# Patient Record
Sex: FEMALE | Race: WHITE | HISPANIC OR LATINO
[De-identification: names, ages, dates, MRNs, and addresses within clinical notes are randomized per-mention and may not be internally consistent; named-entity substitution may affect disease eponyms.]

---

## 2017-01-05 ENCOUNTER — APPOINTMENT (OUTPATIENT)
Dept: PULMONOLOGY | Facility: CLINIC | Age: 32
End: 2017-01-05

## 2017-01-05 VITALS
SYSTOLIC BLOOD PRESSURE: 120 MMHG | TEMPERATURE: 98.8 F | BODY MASS INDEX: 51.03 KG/M2 | HEART RATE: 89 BPM | RESPIRATION RATE: 16 BRPM | HEIGHT: 63 IN | DIASTOLIC BLOOD PRESSURE: 66 MMHG | WEIGHT: 288 LBS

## 2017-01-12 ENCOUNTER — APPOINTMENT (OUTPATIENT)
Dept: PAIN MANAGEMENT | Facility: CLINIC | Age: 32
End: 2017-01-12

## 2017-01-12 VITALS
WEIGHT: 288 LBS | DIASTOLIC BLOOD PRESSURE: 74 MMHG | HEART RATE: 116 BPM | HEIGHT: 63 IN | BODY MASS INDEX: 51.03 KG/M2 | SYSTOLIC BLOOD PRESSURE: 115 MMHG

## 2017-01-26 ENCOUNTER — OTHER (OUTPATIENT)
Age: 32
End: 2017-01-26

## 2017-01-27 ENCOUNTER — APPOINTMENT (OUTPATIENT)
Dept: ENDOCRINOLOGY | Facility: CLINIC | Age: 32
End: 2017-01-27

## 2017-01-27 VITALS
BODY MASS INDEX: 51.03 KG/M2 | DIASTOLIC BLOOD PRESSURE: 78 MMHG | HEIGHT: 63 IN | WEIGHT: 288 LBS | OXYGEN SATURATION: 96 % | SYSTOLIC BLOOD PRESSURE: 112 MMHG | HEART RATE: 111 BPM

## 2017-01-31 ENCOUNTER — OTHER (OUTPATIENT)
Age: 32
End: 2017-01-31

## 2017-02-01 ENCOUNTER — APPOINTMENT (OUTPATIENT)
Dept: INTERNAL MEDICINE | Facility: CLINIC | Age: 32
End: 2017-02-01

## 2017-02-01 ENCOUNTER — LABORATORY RESULT (OUTPATIENT)
Age: 32
End: 2017-02-01

## 2017-02-01 ENCOUNTER — OUTPATIENT (OUTPATIENT)
Dept: OUTPATIENT SERVICES | Facility: HOSPITAL | Age: 32
LOS: 1 days | End: 2017-02-01
Payer: MEDICAID

## 2017-02-01 VITALS
DIASTOLIC BLOOD PRESSURE: 70 MMHG | HEIGHT: 63 IN | WEIGHT: 292 LBS | SYSTOLIC BLOOD PRESSURE: 114 MMHG | BODY MASS INDEX: 51.74 KG/M2

## 2017-02-01 DIAGNOSIS — T82.599A OTHER MECHANICAL COMPLICATION OF UNSPECIFIED CARDIAC AND VASCULAR DEVICES AND IMPLANTS, INITIAL ENCOUNTER: Chronic | ICD-10-CM

## 2017-02-01 DIAGNOSIS — I10 ESSENTIAL (PRIMARY) HYPERTENSION: ICD-10-CM

## 2017-02-01 DIAGNOSIS — Z98.2 PRESENCE OF CEREBROSPINAL FLUID DRAINAGE DEVICE: Chronic | ICD-10-CM

## 2017-02-01 DIAGNOSIS — Z98.890 OTHER SPECIFIED POSTPROCEDURAL STATES: Chronic | ICD-10-CM

## 2017-02-01 LAB
HBA1C BLD-MCNC: 6.4 % — HIGH (ref 4–5.6)
URATE SERPL-MCNC: 5.6 MG/DL — SIGNIFICANT CHANGE UP (ref 2.5–7)

## 2017-02-01 PROCEDURE — G0463: CPT

## 2017-02-01 PROCEDURE — 84550 ASSAY OF BLOOD/URIC ACID: CPT

## 2017-02-01 PROCEDURE — 83036 HEMOGLOBIN GLYCOSYLATED A1C: CPT

## 2017-02-02 DIAGNOSIS — E66.01 MORBID (SEVERE) OBESITY DUE TO EXCESS CALORIES: ICD-10-CM

## 2017-02-02 DIAGNOSIS — R00.0 TACHYCARDIA, UNSPECIFIED: ICD-10-CM

## 2017-02-02 DIAGNOSIS — G43.909 MIGRAINE, UNSPECIFIED, NOT INTRACTABLE, WITHOUT STATUS MIGRAINOSUS: ICD-10-CM

## 2017-02-02 DIAGNOSIS — M10.9 GOUT, UNSPECIFIED: ICD-10-CM

## 2017-02-02 DIAGNOSIS — E28.2 POLYCYSTIC OVARIAN SYNDROME: ICD-10-CM

## 2017-02-02 DIAGNOSIS — R73.09 OTHER ABNORMAL GLUCOSE: ICD-10-CM

## 2017-02-02 DIAGNOSIS — F31.9 BIPOLAR DISORDER, UNSPECIFIED: ICD-10-CM

## 2017-02-03 DIAGNOSIS — R47.9 UNSPECIFIED SPEECH DISTURBANCES: ICD-10-CM

## 2017-02-06 ENCOUNTER — APPOINTMENT (OUTPATIENT)
Dept: PAIN MANAGEMENT | Facility: CLINIC | Age: 32
End: 2017-02-06

## 2017-02-06 VITALS — BODY MASS INDEX: 51.91 KG/M2 | WEIGHT: 293 LBS | HEIGHT: 63 IN

## 2017-02-06 VITALS — HEART RATE: 109 BPM | DIASTOLIC BLOOD PRESSURE: 70 MMHG | SYSTOLIC BLOOD PRESSURE: 110 MMHG

## 2017-02-13 ENCOUNTER — EMERGENCY (EMERGENCY)
Facility: HOSPITAL | Age: 32
LOS: 1 days | Discharge: HOME | End: 2017-02-13
Attending: EMERGENCY MEDICINE | Admitting: EMERGENCY MEDICINE
Payer: MEDICAID

## 2017-02-13 VITALS
RESPIRATION RATE: 16 BRPM | HEIGHT: 63 IN | SYSTOLIC BLOOD PRESSURE: 118 MMHG | WEIGHT: 289.91 LBS | HEART RATE: 112 BPM | TEMPERATURE: 99 F | OXYGEN SATURATION: 95 % | DIASTOLIC BLOOD PRESSURE: 84 MMHG

## 2017-02-13 VITALS
OXYGEN SATURATION: 98 % | DIASTOLIC BLOOD PRESSURE: 71 MMHG | RESPIRATION RATE: 18 BRPM | SYSTOLIC BLOOD PRESSURE: 120 MMHG | HEART RATE: 100 BPM

## 2017-02-13 DIAGNOSIS — T82.599A OTHER MECHANICAL COMPLICATION OF UNSPECIFIED CARDIAC AND VASCULAR DEVICES AND IMPLANTS, INITIAL ENCOUNTER: Chronic | ICD-10-CM

## 2017-02-13 DIAGNOSIS — Z98.890 OTHER SPECIFIED POSTPROCEDURAL STATES: Chronic | ICD-10-CM

## 2017-02-13 DIAGNOSIS — Z98.2 PRESENCE OF CEREBROSPINAL FLUID DRAINAGE DEVICE: Chronic | ICD-10-CM

## 2017-02-13 DIAGNOSIS — R51 HEADACHE: ICD-10-CM

## 2017-02-13 LAB
ALBUMIN SERPL ELPH-MCNC: 4.5 G/DL — SIGNIFICANT CHANGE UP (ref 3.3–5)
ALP SERPL-CCNC: 112 U/L — SIGNIFICANT CHANGE UP (ref 40–120)
ALT FLD-CCNC: 111 U/L RC — HIGH (ref 10–45)
ANION GAP SERPL CALC-SCNC: 12 MMOL/L — SIGNIFICANT CHANGE UP (ref 5–17)
APPEARANCE UR: ABNORMAL
AST SERPL-CCNC: 58 U/L — HIGH (ref 10–40)
BASOPHILS # BLD AUTO: 0 K/UL — SIGNIFICANT CHANGE UP (ref 0–0.2)
BASOPHILS NFR BLD AUTO: 0.4 % — SIGNIFICANT CHANGE UP (ref 0–2)
BILIRUB SERPL-MCNC: 0.2 MG/DL — SIGNIFICANT CHANGE UP (ref 0.2–1.2)
BILIRUB UR-MCNC: NEGATIVE — SIGNIFICANT CHANGE UP
BUN SERPL-MCNC: 10 MG/DL — SIGNIFICANT CHANGE UP (ref 7–23)
CALCIUM SERPL-MCNC: 10.2 MG/DL — SIGNIFICANT CHANGE UP (ref 8.4–10.5)
CHLORIDE SERPL-SCNC: 101 MMOL/L — SIGNIFICANT CHANGE UP (ref 96–108)
CO2 SERPL-SCNC: 28 MMOL/L — SIGNIFICANT CHANGE UP (ref 22–31)
COLOR SPEC: YELLOW — SIGNIFICANT CHANGE UP
CREAT SERPL-MCNC: 0.76 MG/DL — SIGNIFICANT CHANGE UP (ref 0.5–1.3)
DIFF PNL FLD: NEGATIVE — SIGNIFICANT CHANGE UP
EOSINOPHIL # BLD AUTO: 0.1 K/UL — SIGNIFICANT CHANGE UP (ref 0–0.5)
EOSINOPHIL NFR BLD AUTO: 1.5 % — SIGNIFICANT CHANGE UP (ref 0–6)
EPI CELLS # UR: SIGNIFICANT CHANGE UP /HPF
GLUCOSE SERPL-MCNC: 111 MG/DL — HIGH (ref 70–99)
GLUCOSE UR QL: NEGATIVE — SIGNIFICANT CHANGE UP
HCG SERPL-ACNC: <2 MIU/ML — SIGNIFICANT CHANGE UP
HCT VFR BLD CALC: 47.2 % — HIGH (ref 34.5–45)
HGB BLD-MCNC: 15.5 G/DL — SIGNIFICANT CHANGE UP (ref 11.5–15.5)
KETONES UR-MCNC: NEGATIVE — SIGNIFICANT CHANGE UP
LEUKOCYTE ESTERASE UR-ACNC: ABNORMAL
LYMPHOCYTES # BLD AUTO: 2 K/UL — SIGNIFICANT CHANGE UP (ref 1–3.3)
LYMPHOCYTES # BLD AUTO: 22 % — SIGNIFICANT CHANGE UP (ref 13–44)
MCHC RBC-ENTMCNC: 29.4 PG — SIGNIFICANT CHANGE UP (ref 27–34)
MCHC RBC-ENTMCNC: 32.9 GM/DL — SIGNIFICANT CHANGE UP (ref 32–36)
MCV RBC AUTO: 89.3 FL — SIGNIFICANT CHANGE UP (ref 80–100)
MONOCYTES # BLD AUTO: 0.6 K/UL — SIGNIFICANT CHANGE UP (ref 0–0.9)
MONOCYTES NFR BLD AUTO: 6.8 % — SIGNIFICANT CHANGE UP (ref 2–14)
NEUTROPHILS # BLD AUTO: 6.5 K/UL — SIGNIFICANT CHANGE UP (ref 1.8–7.4)
NEUTROPHILS NFR BLD AUTO: 69.3 % — SIGNIFICANT CHANGE UP (ref 43–77)
NITRITE UR-MCNC: NEGATIVE — SIGNIFICANT CHANGE UP
PH UR: 7 — SIGNIFICANT CHANGE UP (ref 4.8–8)
PLATELET # BLD AUTO: 459 K/UL — HIGH (ref 150–400)
POTASSIUM SERPL-MCNC: 4 MMOL/L — SIGNIFICANT CHANGE UP (ref 3.5–5.3)
POTASSIUM SERPL-SCNC: 4 MMOL/L — SIGNIFICANT CHANGE UP (ref 3.5–5.3)
PROT SERPL-MCNC: 8.6 G/DL — HIGH (ref 6–8.3)
PROT UR-MCNC: 30 MG/DL
RBC # BLD: 5.28 M/UL — HIGH (ref 3.8–5.2)
RBC # FLD: 13.7 % — SIGNIFICANT CHANGE UP (ref 10.3–14.5)
SODIUM SERPL-SCNC: 141 MMOL/L — SIGNIFICANT CHANGE UP (ref 135–145)
SP GR SPEC: 1.03 — HIGH (ref 1.01–1.02)
UROBILINOGEN FLD QL: NEGATIVE — SIGNIFICANT CHANGE UP
WBC # BLD: 9.3 K/UL — SIGNIFICANT CHANGE UP (ref 3.8–10.5)
WBC # FLD AUTO: 9.3 K/UL — SIGNIFICANT CHANGE UP (ref 3.8–10.5)
WBC UR QL: SIGNIFICANT CHANGE UP /HPF (ref 0–5)

## 2017-02-13 PROCEDURE — 74177 CT ABD & PELVIS W/CONTRAST: CPT

## 2017-02-13 PROCEDURE — 71010: CPT | Mod: 26

## 2017-02-13 PROCEDURE — 70450 CT HEAD/BRAIN W/O DYE: CPT

## 2017-02-13 PROCEDURE — 84702 CHORIONIC GONADOTROPIN TEST: CPT

## 2017-02-13 PROCEDURE — 70450 CT HEAD/BRAIN W/O DYE: CPT | Mod: 26

## 2017-02-13 PROCEDURE — 71010: CPT

## 2017-02-13 PROCEDURE — 99284 EMERGENCY DEPT VISIT MOD MDM: CPT | Mod: 25

## 2017-02-13 PROCEDURE — 80053 COMPREHEN METABOLIC PANEL: CPT

## 2017-02-13 PROCEDURE — 74000: CPT | Mod: 26

## 2017-02-13 PROCEDURE — 70250 X-RAY EXAM OF SKULL: CPT | Mod: 26

## 2017-02-13 PROCEDURE — 70250 X-RAY EXAM OF SKULL: CPT

## 2017-02-13 PROCEDURE — 74000: CPT

## 2017-02-13 PROCEDURE — 96374 THER/PROPH/DIAG INJ IV PUSH: CPT | Mod: XU

## 2017-02-13 PROCEDURE — 81001 URINALYSIS AUTO W/SCOPE: CPT

## 2017-02-13 PROCEDURE — 74177 CT ABD & PELVIS W/CONTRAST: CPT | Mod: 26

## 2017-02-13 PROCEDURE — 99285 EMERGENCY DEPT VISIT HI MDM: CPT

## 2017-02-13 PROCEDURE — 85027 COMPLETE CBC AUTOMATED: CPT

## 2017-02-13 RX ORDER — CEPHALEXIN 500 MG
1 CAPSULE ORAL
Qty: 21 | Refills: 0 | OUTPATIENT
Start: 2017-02-13 | End: 2017-02-20

## 2017-02-13 RX ORDER — METOCLOPRAMIDE HCL 10 MG
10 TABLET ORAL ONCE
Qty: 0 | Refills: 0 | Status: COMPLETED | OUTPATIENT
Start: 2017-02-13 | End: 2017-02-13

## 2017-02-13 RX ORDER — METOCLOPRAMIDE HCL 10 MG
10 TABLET ORAL ONCE
Qty: 0 | Refills: 0 | Status: DISCONTINUED | OUTPATIENT
Start: 2017-02-13 | End: 2017-02-13

## 2017-02-13 RX ORDER — CEPHALEXIN 500 MG
500 CAPSULE ORAL ONCE
Qty: 0 | Refills: 0 | Status: COMPLETED | OUTPATIENT
Start: 2017-02-13 | End: 2017-02-13

## 2017-02-13 RX ORDER — SODIUM CHLORIDE 9 MG/ML
1000 INJECTION INTRAMUSCULAR; INTRAVENOUS; SUBCUTANEOUS ONCE
Qty: 0 | Refills: 0 | Status: COMPLETED | OUTPATIENT
Start: 2017-02-13 | End: 2017-02-13

## 2017-02-13 RX ADMIN — Medication 10 MILLIGRAM(S): at 12:28

## 2017-02-13 RX ADMIN — Medication 500 MILLIGRAM(S): at 18:44

## 2017-02-13 RX ADMIN — SODIUM CHLORIDE 2000 MILLILITER(S): 9 INJECTION INTRAMUSCULAR; INTRAVENOUS; SUBCUTANEOUS at 12:28

## 2017-02-13 NOTE — ED PROVIDER NOTE - NS ED MD SCRIBE ATTENDING SCRIBE SECTIONS
CONSULTATIONS/SHIFT CHANGE/PHYSICAL EXAM/DISPOSITION/HIV/VITAL SIGNS( Pullset)/PAST MEDICAL/SURGICAL/SOCIAL HISTORY/PROGRESS NOTE/REVIEW OF SYSTEMS/RESULTS/HISTORY OF PRESENT ILLNESS

## 2017-02-13 NOTE — ED PROVIDER NOTE - EYES, MLM
Clear bilaterally, pupils equal, round and reactive to light. Clear bilaterally, pupils equal, round and reactive to light. EOMI.

## 2017-02-13 NOTE — ED ADULT NURSE NOTE - PMH
Bipolar illness    Gout    Hydrocephalus  shunt  Lymphadenopathy  mediastinal  Meningitis    Migraine    Migraines    Obesity    PCOS (polycystic ovarian syndrome)    Tachycardia  sinus

## 2017-02-13 NOTE — ED PROVIDER NOTE - DETAILS:
****ATTENDING**** 32yo f hx listed pw HA similar to her prior migraine and Abd pain with discharge. States HA is similar to prior migraine. No recent infection or fever. Abd with discharge at home, mildly tender on exam. Check CT, Labs, Pain control Re eval. Ro Hydrocephalus and shunt malfunction, also ro abd abscess.

## 2017-02-13 NOTE — ED PROVIDER NOTE - PMH
Bipolar illness    Diabetes    Gout    Hydrocephalus  shunt  Lymphadenopathy  mediastinal  Meningitis    Migraine    Migraines    Obesity    PCOS (polycystic ovarian syndrome)    Tachycardia  sinus

## 2017-02-13 NOTE — ED ADULT NURSE NOTE - ED STAT RN HANDOFF DETAILS
Report received from WILLIAM Tom  Pt stable, safety maintained at time of dc with improved symptoms.

## 2017-02-13 NOTE — ED PROVIDER NOTE - GASTROINTESTINAL, MLM
RLW 1x1cm lesion, mildly tender, diffusely arounded, RLQ 1x1cm lesion, mildly tender diffusely around it, no overlying erythema or cellulitis

## 2017-02-13 NOTE — ED PROVIDER NOTE - PROGRESS NOTE DETAILS
Patient is reassessed, states feeling much better at this time, HA has resolved. Will follow up imaging and re eval. RG Patient states she feels that her shunt is out of place. RG ------------ATTENDING NOTE------------   signed out to me, pending shunt series, prelim wnl, pt/family very upset over wait, demanding d/c, understand ddx, tx, tay, close f/u, VS wnl (HR 80's. RR 17)   - Kashmir Silver MD   -------------------------------------------------

## 2017-02-13 NOTE — ED ADULT NURSE NOTE - PSH
Brain ventricular shunt obstruction  2015  H/O brain surgery  14x surgery. Dedeshadi revisions of  shunt  S/P  shunt  2015 latest revision

## 2017-02-13 NOTE — ED PROVIDER NOTE - OBJECTIVE STATEMENT
30 y/o female with pmhx of bipolar illness, gout, diabetes, hydrocephalus, lymphadenopathy, meningitis, migraines, obesity, and PCOS presents with c/o migraine for a few weeks. Pt states she also has had an abdominal abscess since Wednesday (2/8/17). Per mother it was draining pus and blood on Saturday. Per mother, in 2014 she was diagnosed with hydrocephalus, and has a  shunt. Pt states she takes Abilify, and Zofran. Denies changes in vision, vomiting, cough, runny nose, fever, chills, or any other complaints. 32 y/o female with pmhx of bipolar illness, gout, diabetes, hydrocephalus secondary to TBI, lymphadenopathy, meningitis, migraines, obesity, and PCOS presents with c/o migraine for a few weeks. Pt states she also has had an abdominal abscess since Wednesday (2/8/17). Per mother it was draining pus and blood on Saturday. States she has history of abdominal abscess that required wound vac and drainage. Per mother, in 2014 she was diagnosed with hydrocephalus, and has a  shunt. Pt states she takes Abilify, and Zofran. States HA is diffuse similar to her previous Migraine but oral medications were not helping. Denies changes in vision, vomiting, cough, runny nose, fever, chills, or any other complaints.

## 2017-02-13 NOTE — ED PROVIDER NOTE - CARE PLAN
Instructions for follow-up, activity and diet:	1. Follow up with PMD or [facility] at [phone] in 24-48hrs.  2. Follow up with your Neurologist in 24-48 hrs.  3. Continue hydration with fluids.  2. If patient develops worsening symptoms of Headache, nausea, vomiting, change in vision, discharge or pain in abdomen, fever or any other concern return to the ER. Principal Discharge DX:	Headache  Instructions for follow-up, activity and diet:	1. Follow up with PMD or [facility] at [phone] in 24-48hrs.  2. Follow up with your Neurologist in 24-48 hrs.  3. Continue hydration with fluids.  2. If patient develops worsening symptoms of Headache, nausea, vomiting, change in vision, discharge or pain in abdomen, fever or any other concern return to the ER. Principal Discharge DX:	Headache  Instructions for follow-up, activity and diet:	1. Follow up with PMD or [facility] at [phone] in 24-48hrs.  2. Follow up with your Neurologist in 24-48 hrs.  3. Continue hydration with fluids.  2. If patient develops worsening symptoms of Headache, nausea, vomiting, change in vision, discharge or pain in abdomen, fever or any other concern return to the ER. Principal Discharge DX:	Headache  Instructions for follow-up, activity and diet:	1. Follow up with PMD or [facility] at [phone] in 24-48hrs.  2. Follow up with your Neurologist in 24-48 hrs.  3. Continue hydration with fluids.  2. If patient develops worsening symptoms of Headache, nausea, vomiting, change in vision, discharge or pain in abdomen, fever or any other concern return to the ER.

## 2017-02-13 NOTE — ED PROVIDER NOTE - PLAN OF CARE
1. Follow up with PMD or [facility] at [phone] in 24-48hrs.  2. Follow up with your Neurologist in 24-48 hrs.  3. Continue hydration with fluids.  2. If patient develops worsening symptoms of Headache, nausea, vomiting, change in vision, discharge or pain in abdomen, fever or any other concern return to the ER.

## 2017-02-13 NOTE — ED PROVIDER NOTE - MEDICAL DECISION MAKING DETAILS
****ATTENDING**** 30yo f hx listed pw HA similar to her prior migraine and Abd pain with discharge. States HA is similar to prior migraine. No recent infection or fever. Abd with discharge at home, mildly tender on exam. Check CT, Labs, Pain control Re eval. Ro Hydrocephalus and shunt malfunction, also ro abd abscess.

## 2017-02-16 ENCOUNTER — MESSAGE (OUTPATIENT)
Age: 32
End: 2017-02-16

## 2017-02-16 DIAGNOSIS — Z86.39 PERSONAL HISTORY OF OTHER ENDOCRINE, NUTRITIONAL AND METABOLIC DISEASE: ICD-10-CM

## 2017-02-16 DIAGNOSIS — E78.5 HYPERLIPIDEMIA, UNSPECIFIED: ICD-10-CM

## 2017-02-17 ENCOUNTER — APPOINTMENT (OUTPATIENT)
Dept: INTERNAL MEDICINE | Facility: CLINIC | Age: 32
End: 2017-02-17

## 2017-02-23 ENCOUNTER — APPOINTMENT (OUTPATIENT)
Dept: NEUROSURGERY | Facility: CLINIC | Age: 32
End: 2017-02-23

## 2017-02-23 ENCOUNTER — OUTPATIENT (OUTPATIENT)
Dept: OUTPATIENT SERVICES | Facility: HOSPITAL | Age: 32
LOS: 1 days | End: 2017-02-23
Payer: MEDICAID

## 2017-02-23 ENCOUNTER — APPOINTMENT (OUTPATIENT)
Dept: INTERNAL MEDICINE | Facility: CLINIC | Age: 32
End: 2017-02-23

## 2017-02-23 VITALS
HEIGHT: 63 IN | WEIGHT: 290 LBS | BODY MASS INDEX: 51.38 KG/M2 | SYSTOLIC BLOOD PRESSURE: 104 MMHG | DIASTOLIC BLOOD PRESSURE: 72 MMHG

## 2017-02-23 VITALS
TEMPERATURE: 98.1 F | HEART RATE: 114 BPM | SYSTOLIC BLOOD PRESSURE: 116 MMHG | WEIGHT: 292 LBS | RESPIRATION RATE: 16 BRPM | BODY MASS INDEX: 51.74 KG/M2 | HEIGHT: 63 IN | DIASTOLIC BLOOD PRESSURE: 79 MMHG | OXYGEN SATURATION: 97 %

## 2017-02-23 DIAGNOSIS — Z98.2 PRESENCE OF CEREBROSPINAL FLUID DRAINAGE DEVICE: Chronic | ICD-10-CM

## 2017-02-23 DIAGNOSIS — T82.599A OTHER MECHANICAL COMPLICATION OF UNSPECIFIED CARDIAC AND VASCULAR DEVICES AND IMPLANTS, INITIAL ENCOUNTER: Chronic | ICD-10-CM

## 2017-02-23 DIAGNOSIS — I10 ESSENTIAL (PRIMARY) HYPERTENSION: ICD-10-CM

## 2017-02-23 DIAGNOSIS — Z98.890 OTHER SPECIFIED POSTPROCEDURAL STATES: Chronic | ICD-10-CM

## 2017-02-23 PROBLEM — E78.5 HYPERLIPIDEMIA: Status: RESOLVED | Noted: 2017-02-23 | Resolved: 2017-02-23

## 2017-02-23 PROCEDURE — G0463: CPT

## 2017-02-24 DIAGNOSIS — E66.01 MORBID (SEVERE) OBESITY DUE TO EXCESS CALORIES: ICD-10-CM

## 2017-02-24 DIAGNOSIS — R42 DIZZINESS AND GIDDINESS: ICD-10-CM

## 2017-03-09 ENCOUNTER — APPOINTMENT (OUTPATIENT)
Dept: SLEEP CENTER | Facility: CLINIC | Age: 32
End: 2017-03-09

## 2017-03-09 ENCOUNTER — OUTPATIENT (OUTPATIENT)
Dept: OUTPATIENT SERVICES | Facility: HOSPITAL | Age: 32
LOS: 1 days | End: 2017-03-09
Payer: MEDICAID

## 2017-03-09 DIAGNOSIS — Z98.890 OTHER SPECIFIED POSTPROCEDURAL STATES: Chronic | ICD-10-CM

## 2017-03-09 DIAGNOSIS — T82.599A OTHER MECHANICAL COMPLICATION OF UNSPECIFIED CARDIAC AND VASCULAR DEVICES AND IMPLANTS, INITIAL ENCOUNTER: Chronic | ICD-10-CM

## 2017-03-09 DIAGNOSIS — Z98.2 PRESENCE OF CEREBROSPINAL FLUID DRAINAGE DEVICE: Chronic | ICD-10-CM

## 2017-03-09 PROCEDURE — 95807 SLEEP STUDY ATTENDED: CPT

## 2017-03-10 DIAGNOSIS — G47.33 OBSTRUCTIVE SLEEP APNEA (ADULT) (PEDIATRIC): ICD-10-CM

## 2017-04-05 ENCOUNTER — EMERGENCY (EMERGENCY)
Facility: HOSPITAL | Age: 32
LOS: 1 days | Discharge: HOME | End: 2017-04-05
Attending: EMERGENCY MEDICINE | Admitting: EMERGENCY MEDICINE
Payer: MEDICAID

## 2017-04-05 VITALS
HEART RATE: 112 BPM | DIASTOLIC BLOOD PRESSURE: 81 MMHG | OXYGEN SATURATION: 86 % | RESPIRATION RATE: 16 BRPM | TEMPERATURE: 98 F | SYSTOLIC BLOOD PRESSURE: 107 MMHG

## 2017-04-05 DIAGNOSIS — T82.599A OTHER MECHANICAL COMPLICATION OF UNSPECIFIED CARDIAC AND VASCULAR DEVICES AND IMPLANTS, INITIAL ENCOUNTER: Chronic | ICD-10-CM

## 2017-04-05 DIAGNOSIS — Z98.890 OTHER SPECIFIED POSTPROCEDURAL STATES: Chronic | ICD-10-CM

## 2017-04-05 DIAGNOSIS — H53.8 OTHER VISUAL DISTURBANCES: ICD-10-CM

## 2017-04-05 DIAGNOSIS — Z88.8 ALLERGY STATUS TO OTHER DRUGS, MEDICAMENTS AND BIOLOGICAL SUBSTANCES STATUS: ICD-10-CM

## 2017-04-05 DIAGNOSIS — Z98.890 OTHER SPECIFIED POSTPROCEDURAL STATES: ICD-10-CM

## 2017-04-05 DIAGNOSIS — Z79.899 OTHER LONG TERM (CURRENT) DRUG THERAPY: ICD-10-CM

## 2017-04-05 DIAGNOSIS — Z98.2 PRESENCE OF CEREBROSPINAL FLUID DRAINAGE DEVICE: Chronic | ICD-10-CM

## 2017-04-05 DIAGNOSIS — E11.9 TYPE 2 DIABETES MELLITUS WITHOUT COMPLICATIONS: ICD-10-CM

## 2017-04-05 PROCEDURE — 93010 ELECTROCARDIOGRAM REPORT: CPT

## 2017-04-05 PROCEDURE — 99285 EMERGENCY DEPT VISIT HI MDM: CPT | Mod: 25

## 2017-04-05 NOTE — ED ADULT TRIAGE NOTE - CHIEF COMPLAINT QUOTE
change in vision: left eye cannot see and right eye is blurry and   pain in left side of head   2 shunts in head

## 2017-04-05 NOTE — ED ADULT NURSE NOTE - OBJECTIVE STATEMENT
31 year old female patient with pmh of hydrocephalus and 2  shuts presents to ED with sudden onset of blindness to L eye and blurriness to R eye and headache. Patient states the visual changes started immediately after laying down. Patient reports chronic migraine headaches, and states the pain is currently tolerable. Denies previous similar episodes of visual changes, loss of balance or coordination, fever, chills. Able to ambulate with steady gait. Pt reporting some numbness and tingling to R arm and hand. Family at bedside

## 2017-04-06 VITALS
OXYGEN SATURATION: 98 % | DIASTOLIC BLOOD PRESSURE: 80 MMHG | SYSTOLIC BLOOD PRESSURE: 117 MMHG | RESPIRATION RATE: 16 BRPM | HEART RATE: 110 BPM | TEMPERATURE: 98 F

## 2017-04-06 LAB
ALBUMIN SERPL ELPH-MCNC: 4.3 G/DL — SIGNIFICANT CHANGE UP (ref 3.3–5)
ALP SERPL-CCNC: 113 U/L — SIGNIFICANT CHANGE UP (ref 40–120)
ALT FLD-CCNC: 111 U/L RC — HIGH (ref 10–45)
ANION GAP SERPL CALC-SCNC: 18 MMOL/L — HIGH (ref 5–17)
APTT BLD: 32 SEC — SIGNIFICANT CHANGE UP (ref 27.5–37.4)
AST SERPL-CCNC: 67 U/L — HIGH (ref 10–40)
BASOPHILS # BLD AUTO: 0 K/UL — SIGNIFICANT CHANGE UP (ref 0–0.2)
BASOPHILS NFR BLD AUTO: 0.3 % — SIGNIFICANT CHANGE UP (ref 0–2)
BILIRUB SERPL-MCNC: 0.2 MG/DL — SIGNIFICANT CHANGE UP (ref 0.2–1.2)
BUN SERPL-MCNC: 17 MG/DL — SIGNIFICANT CHANGE UP (ref 7–23)
CALCIUM SERPL-MCNC: 10.1 MG/DL — SIGNIFICANT CHANGE UP (ref 8.4–10.5)
CHLORIDE SERPL-SCNC: 100 MMOL/L — SIGNIFICANT CHANGE UP (ref 96–108)
CO2 SERPL-SCNC: 24 MMOL/L — SIGNIFICANT CHANGE UP (ref 22–31)
CREAT SERPL-MCNC: 0.92 MG/DL — SIGNIFICANT CHANGE UP (ref 0.5–1.3)
EOSINOPHIL # BLD AUTO: 0.2 K/UL — SIGNIFICANT CHANGE UP (ref 0–0.5)
EOSINOPHIL NFR BLD AUTO: 2.1 % — SIGNIFICANT CHANGE UP (ref 0–6)
ERYTHROCYTE [SEDIMENTATION RATE] IN BLOOD: 55 MM/HR — HIGH (ref 0–15)
GLUCOSE SERPL-MCNC: 111 MG/DL — HIGH (ref 70–99)
HCT VFR BLD CALC: 44.8 % — SIGNIFICANT CHANGE UP (ref 34.5–45)
HGB BLD-MCNC: 15.3 G/DL — SIGNIFICANT CHANGE UP (ref 11.5–15.5)
INR BLD: 1 RATIO — SIGNIFICANT CHANGE UP (ref 0.88–1.16)
LYMPHOCYTES # BLD AUTO: 2.2 K/UL — SIGNIFICANT CHANGE UP (ref 1–3.3)
LYMPHOCYTES # BLD AUTO: 20.5 % — SIGNIFICANT CHANGE UP (ref 13–44)
MCHC RBC-ENTMCNC: 30.3 PG — SIGNIFICANT CHANGE UP (ref 27–34)
MCHC RBC-ENTMCNC: 34.1 GM/DL — SIGNIFICANT CHANGE UP (ref 32–36)
MCV RBC AUTO: 88.7 FL — SIGNIFICANT CHANGE UP (ref 80–100)
MONOCYTES # BLD AUTO: 0.7 K/UL — SIGNIFICANT CHANGE UP (ref 0–0.9)
MONOCYTES NFR BLD AUTO: 7 % — SIGNIFICANT CHANGE UP (ref 2–14)
NEUTROPHILS # BLD AUTO: 7.5 K/UL — HIGH (ref 1.8–7.4)
NEUTROPHILS NFR BLD AUTO: 70.1 % — SIGNIFICANT CHANGE UP (ref 43–77)
PLATELET # BLD AUTO: 428 K/UL — HIGH (ref 150–400)
POTASSIUM SERPL-MCNC: 4.1 MMOL/L — SIGNIFICANT CHANGE UP (ref 3.5–5.3)
POTASSIUM SERPL-SCNC: 4.1 MMOL/L — SIGNIFICANT CHANGE UP (ref 3.5–5.3)
PROT SERPL-MCNC: 8.4 G/DL — HIGH (ref 6–8.3)
PROTHROM AB SERPL-ACNC: 10.8 SEC — SIGNIFICANT CHANGE UP (ref 9.8–12.7)
RBC # BLD: 5.05 M/UL — SIGNIFICANT CHANGE UP (ref 3.8–5.2)
RBC # FLD: 14 % — SIGNIFICANT CHANGE UP (ref 10.3–14.5)
SODIUM SERPL-SCNC: 142 MMOL/L — SIGNIFICANT CHANGE UP (ref 135–145)
WBC # BLD: 10.6 K/UL — HIGH (ref 3.8–10.5)
WBC # FLD AUTO: 10.6 K/UL — HIGH (ref 3.8–10.5)

## 2017-04-06 PROCEDURE — 80053 COMPREHEN METABOLIC PANEL: CPT

## 2017-04-06 PROCEDURE — 85610 PROTHROMBIN TIME: CPT

## 2017-04-06 PROCEDURE — 74000: CPT | Mod: 26

## 2017-04-06 PROCEDURE — 70450 CT HEAD/BRAIN W/O DYE: CPT | Mod: 26,59

## 2017-04-06 PROCEDURE — 93005 ELECTROCARDIOGRAM TRACING: CPT

## 2017-04-06 PROCEDURE — 85027 COMPLETE CBC AUTOMATED: CPT

## 2017-04-06 PROCEDURE — 71010: CPT | Mod: 26

## 2017-04-06 PROCEDURE — 70250 X-RAY EXAM OF SKULL: CPT

## 2017-04-06 PROCEDURE — 70450 CT HEAD/BRAIN W/O DYE: CPT

## 2017-04-06 PROCEDURE — 85652 RBC SED RATE AUTOMATED: CPT

## 2017-04-06 PROCEDURE — 96374 THER/PROPH/DIAG INJ IV PUSH: CPT | Mod: XU

## 2017-04-06 PROCEDURE — 70250 X-RAY EXAM OF SKULL: CPT | Mod: 26

## 2017-04-06 PROCEDURE — 71010: CPT

## 2017-04-06 PROCEDURE — 99284 EMERGENCY DEPT VISIT MOD MDM: CPT | Mod: 25

## 2017-04-06 PROCEDURE — 70496 CT ANGIOGRAPHY HEAD: CPT | Mod: 26

## 2017-04-06 PROCEDURE — 70496 CT ANGIOGRAPHY HEAD: CPT

## 2017-04-06 PROCEDURE — 74000: CPT

## 2017-04-06 PROCEDURE — 85730 THROMBOPLASTIN TIME PARTIAL: CPT

## 2017-04-06 RX ORDER — ACETAMINOPHEN 500 MG
650 TABLET ORAL ONCE
Qty: 0 | Refills: 0 | Status: DISCONTINUED | OUTPATIENT
Start: 2017-04-06 | End: 2017-04-06

## 2017-04-06 RX ORDER — ACETAMINOPHEN 500 MG
1000 TABLET ORAL ONCE
Qty: 0 | Refills: 0 | Status: COMPLETED | OUTPATIENT
Start: 2017-04-06 | End: 2017-04-06

## 2017-04-06 RX ADMIN — Medication 1000 MILLIGRAM(S): at 04:00

## 2017-04-06 RX ADMIN — Medication 400 MILLIGRAM(S): at 03:21

## 2017-04-06 NOTE — ED PROVIDER NOTE - ATTENDING CONTRIBUTION TO CARE
I was physically present for the E/M service provided. I agree with above history, physical, and plan which I have reviewed and edited where appropriate. I was physically present for the key portions of the service provided.    Transient bilateral blurry vision in pt w/ h/o  shunt 2/2 hydroecephalus and migraine HA  -Neurologic exam: A&O x3, speech clear, LJ, CN II-XII intact, motor strength +5/5 in all extremities, sensation equal bilaterally, finger-to-nose normal, gait steady. Visual acuity as in PE.  -No acute stroke on CT Head + CTA, evaluated by neurology in ED and cleared for outpt f/u  - shunt intact on series,  evaluated by neurosurgery in ED and cleared for outpt f/u  -No retinal detachment on bedside US  -Sx spontaneously resolved during ED course  f/u Neurology outpt.

## 2017-04-06 NOTE — ED PROVIDER NOTE - MEDICAL DECISION MAKING DETAILS
30 yo female with visual changes; concerning for shunt problem vs stroke vs migraine; will obtain cth cta xray shunt labs --> re eval

## 2017-04-06 NOTE — ED PROVIDER NOTE - PROGRESS NOTE DETAILS
states her vision has improved in her left eye pt states her vision is more improved, able to walk without difficulty. pt is known to have tachycardia, is usually in the 120s. currently 111, stable for DC pt is known to have tachycardia, is usually in the 120s. currently 111, no CP, SOB or veritgo, stable for DC

## 2017-04-06 NOTE — ED PROVIDER NOTE - HEAD, MLM
Head is atraumatic. Head shape is symmetrical. Head is atraumatic. Head shape is symmetrical. No temporal TTP.

## 2017-04-06 NOTE — ED PROVIDER NOTE - NEUROLOGICAL, MLM
Alert and oriented, sensation and motor testing intact with no new changes, baseline tingling sensation over right hand and anterior right thigh, finger nose testing intact, nonataxic

## 2017-04-06 NOTE — ED PROVIDER NOTE - PLAN OF CARE
Please follow up with ophthalmology tomorrow in regards to your visual symptoms.  The number for our ophthalmology clinic is [phone].  Take Tylenol 1g every six hours and supplement with ibuprofen 600mg, with food, every six hours which can be taken three hours apart from the Tylenol to have a layered effect.  Drink at least 2 Liters or 64 Ounces of water each day.  Return for any persistent, worsening symptoms, or ANY concerns at all.

## 2017-04-06 NOTE — ED PROVIDER NOTE - OBJECTIVE STATEMENT
30 yo female with hydrocephalus s/p 2 shunts presenting with transient blindness of the left eye after patient layed down with right eye blurriness.  patient states when she layed down everything went dark.  when she got up, she couldn't see in the left eye and blurriness in the right eye.  has not happened to her in the past.  has not taken anything for her symptoms.  also headache on left side, pin and needles sensation which has been there since the morning.  denies fevers, chills, n/v/d, chronic numbness in right thigh and hand.    neurosurgeon- elpidio 30 yo female with hydrocephalus s/p 2 shunts presenting with transient blindness of the left eye after patient layed down with right eye blurriness.  patient states when she layed down everything went dark.  when she got up, she couldn't see in the left eye and blurriness in the right eye.  has not happened to her in the past.  has not taken anything for her symptoms.  also headache on left side, pin and needles sensation which has been there since the morning.  feels like her typical migraine symptoms.  denies fevers, chills, n/v/d, chronic numbness in right thigh and hand.    neurosurgeon- elpidio 30 yo female with hydrocephalus s/p 2 shunts presenting with transient vision changes of the left eye after patient lay down with right eye blurriness.  patient states when she lay down everything went dark.  when she got up, she couldn't see in the left eye and blurriness in the right eye.  has not happened to her in the past.  has not taken anything for her symptoms.  also headache on left side, pin and needles sensation which has been there since the morning.  feels like her typical migraine symptoms.  denies fevers, chills, n/v/d, no neck pain, chronic numbness in right thigh and hand.    neurosurgeon- elpidio

## 2017-04-06 NOTE — ED PROVIDER NOTE - CARE PLAN
Principal Discharge DX:	Visual field defect  Instructions for follow-up, activity and diet:	Please follow up with ophthalmology tomorrow in regards to your visual symptoms.  The number for our ophthalmology clinic is [phone].  Take Tylenol 1g every six hours and supplement with ibuprofen 600mg, with food, every six hours which can be taken three hours apart from the Tylenol to have a layered effect.  Drink at least 2 Liters or 64 Ounces of water each day.  Return for any persistent, worsening symptoms, or ANY concerns at all. Principal Discharge DX:	Visual field defect  Instructions for follow-up, activity and diet:	Please follow up with ophthalmology tomorrow in regards to your visual symptoms.  The number for our ophthalmology clinic is [phone].  Take Tylenol 1g every six hours and supplement with ibuprofen 600mg, with food, every six hours which can be taken three hours apart from the Tylenol to have a layered effect.  Drink at least 2 Liters or 64 Ounces of water each day.  Return for any persistent, worsening symptoms, or ANY concerns at all. Principal Discharge DX:	Blurry vision, bilateral  Instructions for follow-up, activity and diet:	Please follow up with ophthalmology tomorrow in regards to your visual symptoms.  The number for our ophthalmology clinic is [phone].  Take Tylenol 1g every six hours and supplement with ibuprofen 600mg, with food, every six hours which can be taken three hours apart from the Tylenol to have a layered effect.  Drink at least 2 Liters or 64 Ounces of water each day.  Return for any persistent, worsening symptoms, or ANY concerns at all. Principal Discharge DX:	Blurry vision, bilateral  Instructions for follow-up, activity and diet:	Please follow up with ophthalmology tomorrow in regards to your visual symptoms.  The number for our ophthalmology clinic is [phone].  Take Tylenol 1g every six hours and supplement with ibuprofen 600mg, with food, every six hours which can be taken three hours apart from the Tylenol to have a layered effect.  Drink at least 2 Liters or 64 Ounces of water each day.  Return for any persistent, worsening symptoms, or ANY concerns at all.

## 2017-04-10 ENCOUNTER — APPOINTMENT (OUTPATIENT)
Dept: INTERNAL MEDICINE | Facility: CLINIC | Age: 32
End: 2017-04-10

## 2017-04-10 ENCOUNTER — OUTPATIENT (OUTPATIENT)
Dept: OUTPATIENT SERVICES | Facility: HOSPITAL | Age: 32
LOS: 1 days | End: 2017-04-10
Payer: MEDICAID

## 2017-04-10 VITALS
WEIGHT: 290 LBS | BODY MASS INDEX: 51.37 KG/M2 | SYSTOLIC BLOOD PRESSURE: 124 MMHG | DIASTOLIC BLOOD PRESSURE: 82 MMHG | HEART RATE: 114 BPM

## 2017-04-10 DIAGNOSIS — G47.33 OBSTRUCTIVE SLEEP APNEA (ADULT) (PEDIATRIC): ICD-10-CM

## 2017-04-10 DIAGNOSIS — M10.9 GOUT, UNSPECIFIED: ICD-10-CM

## 2017-04-10 DIAGNOSIS — I10 ESSENTIAL (PRIMARY) HYPERTENSION: ICD-10-CM

## 2017-04-10 DIAGNOSIS — R59.0 LOCALIZED ENLARGED LYMPH NODES: ICD-10-CM

## 2017-04-10 DIAGNOSIS — K21.9 GASTRO-ESOPHAGEAL REFLUX DISEASE W/OUT ESOPHAGITIS: ICD-10-CM

## 2017-04-10 DIAGNOSIS — G57.11 MERALGIA PARESTHETICA, RIGHT LOWER LIMB: ICD-10-CM

## 2017-04-10 DIAGNOSIS — Z98.2 PRESENCE OF CEREBROSPINAL FLUID DRAINAGE DEVICE: Chronic | ICD-10-CM

## 2017-04-10 DIAGNOSIS — T82.599A OTHER MECHANICAL COMPLICATION OF UNSPECIFIED CARDIAC AND VASCULAR DEVICES AND IMPLANTS, INITIAL ENCOUNTER: Chronic | ICD-10-CM

## 2017-04-10 DIAGNOSIS — R60.0 LOCALIZED EDEMA: ICD-10-CM

## 2017-04-10 DIAGNOSIS — R07.89 OTHER CHEST PAIN: ICD-10-CM

## 2017-04-10 DIAGNOSIS — Z98.890 OTHER SPECIFIED POSTPROCEDURAL STATES: Chronic | ICD-10-CM

## 2017-04-10 DIAGNOSIS — R93.8 ABNORMAL FINDINGS ON DIAGNOSTIC IMAGING OF OTHER SPECIFIED BODY STRUCTURES: ICD-10-CM

## 2017-04-10 DIAGNOSIS — Z86.69 PERSONAL HISTORY OF OTHER DISEASES OF THE NERVOUS SYSTEM AND SENSE ORGANS: ICD-10-CM

## 2017-04-10 PROCEDURE — G0463: CPT

## 2017-04-10 RX ORDER — MECLIZINE HYDROCHLORIDE 25 MG/1
25 TABLET ORAL
Qty: 90 | Refills: 0 | Status: DISCONTINUED | COMMUNITY
Start: 2017-02-01 | End: 2017-04-10

## 2017-04-13 ENCOUNTER — APPOINTMENT (OUTPATIENT)
Dept: OPHTHALMOLOGY | Facility: CLINIC | Age: 32
End: 2017-04-13

## 2017-04-13 ENCOUNTER — APPOINTMENT (OUTPATIENT)
Dept: PAIN MANAGEMENT | Facility: CLINIC | Age: 32
End: 2017-04-13

## 2017-04-13 VITALS
HEART RATE: 123 BPM | SYSTOLIC BLOOD PRESSURE: 125 MMHG | WEIGHT: 290 LBS | BODY MASS INDEX: 51.38 KG/M2 | HEIGHT: 63 IN | DIASTOLIC BLOOD PRESSURE: 85 MMHG

## 2017-04-13 DIAGNOSIS — H55.09 OTHER FORMS OF NYSTAGMUS: ICD-10-CM

## 2017-04-13 DIAGNOSIS — G91.1 OBSTRUCTIVE HYDROCEPHALUS: ICD-10-CM

## 2017-04-13 DIAGNOSIS — H53.453 OTHER LOCALIZED VISUAL FIELD DEFECT, BILATERAL: ICD-10-CM

## 2017-04-17 ENCOUNTER — OTHER (OUTPATIENT)
Age: 32
End: 2017-04-17

## 2017-04-18 DIAGNOSIS — Z00.00 ENCOUNTER FOR GENERAL ADULT MEDICAL EXAMINATION WITHOUT ABNORMAL FINDINGS: ICD-10-CM

## 2017-04-18 DIAGNOSIS — R51 HEADACHE: ICD-10-CM

## 2017-04-18 DIAGNOSIS — G56.01 CARPAL TUNNEL SYNDROME, RIGHT UPPER LIMB: ICD-10-CM

## 2017-04-28 ENCOUNTER — APPOINTMENT (OUTPATIENT)
Dept: OPHTHALMOLOGY | Facility: CLINIC | Age: 32
End: 2017-04-28

## 2017-05-08 ENCOUNTER — APPOINTMENT (OUTPATIENT)
Dept: ENDOCRINOLOGY | Facility: CLINIC | Age: 32
End: 2017-05-08

## 2017-05-08 VITALS — DIASTOLIC BLOOD PRESSURE: 74 MMHG | HEART RATE: 89 BPM | SYSTOLIC BLOOD PRESSURE: 110 MMHG | OXYGEN SATURATION: 98 %

## 2017-05-08 DIAGNOSIS — L68.0 HIRSUTISM: ICD-10-CM

## 2017-05-08 RX ORDER — ALLOPURINOL 100 MG/1
100 TABLET ORAL
Qty: 60 | Refills: 0 | Status: DISCONTINUED | COMMUNITY
Start: 2016-12-28 | End: 2017-05-08

## 2017-05-08 RX ORDER — TOPIRAMATE 25 MG/1
25 TABLET, FILM COATED ORAL AT BEDTIME
Qty: 7 | Refills: 0 | Status: DISCONTINUED | COMMUNITY
Start: 2017-04-10 | End: 2017-05-08

## 2017-05-09 ENCOUNTER — RX RENEWAL (OUTPATIENT)
Age: 32
End: 2017-05-09

## 2017-05-09 LAB
25(OH)D3 SERPL-MCNC: 27.4 NG/ML
HBA1C MFR BLD HPLC: 6.3 %

## 2017-05-15 ENCOUNTER — APPOINTMENT (OUTPATIENT)
Dept: INTERNAL MEDICINE | Facility: CLINIC | Age: 32
End: 2017-05-15

## 2017-05-15 ENCOUNTER — OUTPATIENT (OUTPATIENT)
Dept: OUTPATIENT SERVICES | Facility: HOSPITAL | Age: 32
LOS: 1 days | End: 2017-05-15
Payer: MEDICARE

## 2017-05-15 VITALS
HEART RATE: 140 BPM | HEIGHT: 63 IN | OXYGEN SATURATION: 95 % | WEIGHT: 290 LBS | DIASTOLIC BLOOD PRESSURE: 80 MMHG | SYSTOLIC BLOOD PRESSURE: 110 MMHG | BODY MASS INDEX: 51.38 KG/M2

## 2017-05-15 DIAGNOSIS — I10 ESSENTIAL (PRIMARY) HYPERTENSION: ICD-10-CM

## 2017-05-15 DIAGNOSIS — R26.81 UNSTEADINESS ON FEET: ICD-10-CM

## 2017-05-15 DIAGNOSIS — G43.109 MIGRAINE WITH AURA, NOT INTRACTABLE, WITHOUT STATUS MIGRAINOSUS: ICD-10-CM

## 2017-05-15 DIAGNOSIS — F31.9 BIPOLAR DISORDER, UNSPECIFIED: ICD-10-CM

## 2017-05-15 DIAGNOSIS — Z98.2 PRESENCE OF CEREBROSPINAL FLUID DRAINAGE DEVICE: Chronic | ICD-10-CM

## 2017-05-15 DIAGNOSIS — G56.01 CARPAL TUNNEL SYNDROME, RIGHT UPPER LIMB: ICD-10-CM

## 2017-05-15 DIAGNOSIS — Z00.00 ENCOUNTER FOR GENERAL ADULT MEDICAL EXAMINATION W/OUT ABNORMAL FINDINGS: ICD-10-CM

## 2017-05-15 DIAGNOSIS — T82.599A OTHER MECHANICAL COMPLICATION OF UNSPECIFIED CARDIAC AND VASCULAR DEVICES AND IMPLANTS, INITIAL ENCOUNTER: Chronic | ICD-10-CM

## 2017-05-15 DIAGNOSIS — Z98.890 OTHER SPECIFIED POSTPROCEDURAL STATES: Chronic | ICD-10-CM

## 2017-05-19 LAB
ALBUMIN SERPL ELPH-MCNC: 4.6 G/DL
ALP BLD-CCNC: 115 U/L
ALT SERPL-CCNC: 98 U/L
ANION GAP SERPL CALC-SCNC: 18 MMOL/L
AST SERPL-CCNC: 49 U/L
BASOPHILS # BLD AUTO: 0.02 K/UL
BASOPHILS NFR BLD AUTO: 0.2 %
BILIRUB SERPL-MCNC: 0.3 MG/DL
BUN SERPL-MCNC: 13 MG/DL
CALCIUM SERPL-MCNC: 11.2 MG/DL
CHLORIDE SERPL-SCNC: 98 MMOL/L
CO2 SERPL-SCNC: 24 MMOL/L
CREAT SERPL-MCNC: 0.93 MG/DL
EOSINOPHIL # BLD AUTO: 0.16 K/UL
EOSINOPHIL NFR BLD AUTO: 1.5 %
GLUCOSE SERPL-MCNC: 136 MG/DL
HCT VFR BLD CALC: 47.7 %
HGB BLD-MCNC: 15.5 G/DL
IMM GRANULOCYTES NFR BLD AUTO: 0.4 %
LYMPHOCYTES # BLD AUTO: 2.51 K/UL
LYMPHOCYTES NFR BLD AUTO: 24.2 %
MAN DIFF?: NORMAL
MCHC RBC-ENTMCNC: 29.3 PG
MCHC RBC-ENTMCNC: 32.5 GM/DL
MCV RBC AUTO: 90.2 FL
MONOCYTES # BLD AUTO: 0.61 K/UL
MONOCYTES NFR BLD AUTO: 5.9 %
NEUTROPHILS # BLD AUTO: 7.03 K/UL
NEUTROPHILS NFR BLD AUTO: 67.8 %
PLATELET # BLD AUTO: 460 K/UL
POTASSIUM SERPL-SCNC: 4.7 MMOL/L
PROT SERPL-MCNC: 8.6 G/DL
RBC # BLD: 5.29 M/UL
RBC # FLD: 15 %
SODIUM SERPL-SCNC: 140 MMOL/L
WBC # FLD AUTO: 10.37 K/UL

## 2017-05-22 ENCOUNTER — MESSAGE (OUTPATIENT)
Age: 32
End: 2017-05-22

## 2017-05-23 ENCOUNTER — LABORATORY RESULT (OUTPATIENT)
Age: 32
End: 2017-05-23

## 2017-05-23 PROCEDURE — G0463: CPT

## 2017-05-23 PROCEDURE — 82330 ASSAY OF CALCIUM: CPT

## 2017-05-23 PROCEDURE — 82310 ASSAY OF CALCIUM: CPT

## 2017-05-23 PROCEDURE — 83970 ASSAY OF PARATHORMONE: CPT

## 2017-05-23 PROCEDURE — 82306 VITAMIN D 25 HYDROXY: CPT

## 2017-05-23 PROCEDURE — 82652 VIT D 1 25-DIHYDROXY: CPT

## 2017-05-24 LAB
24R-OH-CALCIDIOL SERPL-MCNC: 22.8 NG/ML — LOW (ref 30–100)
CA-I BLD-SCNC: 1.38 MMOL/L — HIGH (ref 1.05–1.34)
CALCIUM SERPL-MCNC: 10.5 MG/DL — SIGNIFICANT CHANGE UP (ref 8.4–10.5)
CALCIUM SERPL-MCNC: 10.5 MG/DL — SIGNIFICANT CHANGE UP (ref 8.4–10.5)
PTH-INTACT FLD-MCNC: 35 PG/ML — SIGNIFICANT CHANGE UP (ref 15–65)
VIT D25+D1,25 OH+D1,25 PNL SERPL-MCNC: 59.2 PG/ML — SIGNIFICANT CHANGE UP (ref 19.9–79.3)

## 2017-05-26 ENCOUNTER — APPOINTMENT (OUTPATIENT)
Dept: OPHTHALMOLOGY | Facility: CLINIC | Age: 32
End: 2017-05-26

## 2017-05-30 ENCOUNTER — MEDICATION RENEWAL (OUTPATIENT)
Age: 32
End: 2017-05-30

## 2017-06-06 ENCOUNTER — MEDICATION RENEWAL (OUTPATIENT)
Age: 32
End: 2017-06-06

## 2017-06-13 ENCOUNTER — APPOINTMENT (OUTPATIENT)
Dept: OTOLARYNGOLOGY | Facility: CLINIC | Age: 32
End: 2017-06-13

## 2017-06-13 VITALS
HEART RATE: 117 BPM | HEIGHT: 63 IN | BODY MASS INDEX: 51.38 KG/M2 | WEIGHT: 290 LBS | SYSTOLIC BLOOD PRESSURE: 119 MMHG | DIASTOLIC BLOOD PRESSURE: 87 MMHG

## 2017-06-13 DIAGNOSIS — H61.23 IMPACTED CERUMEN, BILATERAL: ICD-10-CM

## 2017-06-13 DIAGNOSIS — H55.00 UNSPECIFIED NYSTAGMUS: ICD-10-CM

## 2017-06-20 ENCOUNTER — OUTPATIENT (OUTPATIENT)
Dept: OUTPATIENT SERVICES | Facility: HOSPITAL | Age: 32
LOS: 1 days | End: 2017-06-20
Payer: MEDICARE

## 2017-06-20 ENCOUNTER — APPOINTMENT (OUTPATIENT)
Dept: OBGYN | Facility: CLINIC | Age: 32
End: 2017-06-20

## 2017-06-20 VITALS — DIASTOLIC BLOOD PRESSURE: 76 MMHG | WEIGHT: 289 LBS | BODY MASS INDEX: 51.19 KG/M2 | SYSTOLIC BLOOD PRESSURE: 118 MMHG

## 2017-06-20 DIAGNOSIS — T82.599A OTHER MECHANICAL COMPLICATION OF UNSPECIFIED CARDIAC AND VASCULAR DEVICES AND IMPLANTS, INITIAL ENCOUNTER: Chronic | ICD-10-CM

## 2017-06-20 DIAGNOSIS — Z98.890 OTHER SPECIFIED POSTPROCEDURAL STATES: Chronic | ICD-10-CM

## 2017-06-20 DIAGNOSIS — N91.1 SECONDARY AMENORRHEA: ICD-10-CM

## 2017-06-20 DIAGNOSIS — Z98.2 PRESENCE OF CEREBROSPINAL FLUID DRAINAGE DEVICE: Chronic | ICD-10-CM

## 2017-06-20 DIAGNOSIS — N76.0 ACUTE VAGINITIS: ICD-10-CM

## 2017-06-20 PROCEDURE — G0463: CPT

## 2017-06-22 ENCOUNTER — APPOINTMENT (OUTPATIENT)
Dept: INTERNAL MEDICINE | Facility: CLINIC | Age: 32
End: 2017-06-22

## 2017-06-22 ENCOUNTER — APPOINTMENT (OUTPATIENT)
Dept: OBGYN | Facility: CLINIC | Age: 32
End: 2017-06-22

## 2017-06-22 ENCOUNTER — OUTPATIENT (OUTPATIENT)
Dept: OUTPATIENT SERVICES | Facility: HOSPITAL | Age: 32
LOS: 1 days | End: 2017-06-22
Payer: MEDICARE

## 2017-06-22 ENCOUNTER — RX RENEWAL (OUTPATIENT)
Age: 32
End: 2017-06-22

## 2017-06-22 VITALS — DIASTOLIC BLOOD PRESSURE: 75 MMHG | SYSTOLIC BLOOD PRESSURE: 119 MMHG

## 2017-06-22 DIAGNOSIS — T82.599A OTHER MECHANICAL COMPLICATION OF UNSPECIFIED CARDIAC AND VASCULAR DEVICES AND IMPLANTS, INITIAL ENCOUNTER: Chronic | ICD-10-CM

## 2017-06-22 DIAGNOSIS — I10 ESSENTIAL (PRIMARY) HYPERTENSION: ICD-10-CM

## 2017-06-22 DIAGNOSIS — Z98.890 OTHER SPECIFIED POSTPROCEDURAL STATES: Chronic | ICD-10-CM

## 2017-06-22 DIAGNOSIS — Z98.2 PRESENCE OF CEREBROSPINAL FLUID DRAINAGE DEVICE: Chronic | ICD-10-CM

## 2017-06-22 RX ORDER — CHROMIUM 200 MCG
1000 TABLET ORAL DAILY
Qty: 30 | Refills: 11 | Status: DISCONTINUED | COMMUNITY
Start: 2017-05-09 | End: 2017-06-22

## 2017-06-23 DIAGNOSIS — E28.2 POLYCYSTIC OVARIAN SYNDROME: ICD-10-CM

## 2017-06-23 DIAGNOSIS — N91.2 AMENORRHEA, UNSPECIFIED: ICD-10-CM

## 2017-06-23 DIAGNOSIS — N91.1 SECONDARY AMENORRHEA: ICD-10-CM

## 2017-06-23 LAB
ALBUMIN SERPL ELPH-MCNC: 4.6 G/DL
ALP BLD-CCNC: 100 U/L
ALT SERPL-CCNC: 88 U/L
ANION GAP SERPL CALC-SCNC: 17 MMOL/L
AST SERPL-CCNC: 50 U/L
BASOPHILS # BLD AUTO: 0.03 K/UL
BASOPHILS NFR BLD AUTO: 0.3 %
BILIRUB SERPL-MCNC: 0.3 MG/DL
BUN SERPL-MCNC: 12 MG/DL
CALCIUM SERPL-MCNC: 10.6 MG/DL
CHLORIDE SERPL-SCNC: 103 MMOL/L
CO2 SERPL-SCNC: 23 MMOL/L
CREAT SERPL-MCNC: 0.87 MG/DL
EOSINOPHIL # BLD AUTO: 0.17 K/UL
EOSINOPHIL NFR BLD AUTO: 1.8 %
GLUCOSE SERPL-MCNC: 114 MG/DL
HBA1C MFR BLD HPLC: 6.4 %
HCT VFR BLD CALC: 45.8 %
HGB BLD-MCNC: 14.6 G/DL
IMM GRANULOCYTES NFR BLD AUTO: 0.3 %
LYMPHOCYTES # BLD AUTO: 2.06 K/UL
LYMPHOCYTES NFR BLD AUTO: 21.8 %
MAN DIFF?: NORMAL
MCHC RBC-ENTMCNC: 29.4 PG
MCHC RBC-ENTMCNC: 31.9 GM/DL
MCV RBC AUTO: 92.2 FL
MONOCYTES # BLD AUTO: 0.48 K/UL
MONOCYTES NFR BLD AUTO: 5.1 %
NEUTROPHILS # BLD AUTO: 6.68 K/UL
NEUTROPHILS NFR BLD AUTO: 70.7 %
PLATELET # BLD AUTO: 498 K/UL
POTASSIUM SERPL-SCNC: 4.6 MMOL/L
PROT SERPL-MCNC: 8.2 G/DL
RBC # BLD: 4.97 M/UL
RBC # FLD: 16.3 %
SODIUM SERPL-SCNC: 143 MMOL/L
WBC # FLD AUTO: 9.45 K/UL

## 2017-06-26 ENCOUNTER — LABORATORY RESULT (OUTPATIENT)
Age: 32
End: 2017-06-26

## 2017-06-26 PROCEDURE — 83970 ASSAY OF PARATHORMONE: CPT

## 2017-06-26 PROCEDURE — G0463: CPT

## 2017-06-26 PROCEDURE — 82330 ASSAY OF CALCIUM: CPT

## 2017-06-26 PROCEDURE — 82652 VIT D 1 25-DIHYDROXY: CPT

## 2017-06-26 PROCEDURE — 83519 RIA NONANTIBODY: CPT

## 2017-06-26 PROCEDURE — 82306 VITAMIN D 25 HYDROXY: CPT

## 2017-06-26 PROCEDURE — 82310 ASSAY OF CALCIUM: CPT

## 2017-06-27 LAB
24R-OH-CALCIDIOL SERPL-MCNC: 29.1 NG/ML — LOW (ref 30–100)
CA-I BLD-SCNC: 1.33 MMOL/L — HIGH (ref 1.12–1.3)
CALCIUM SERPL-MCNC: 10 MG/DL — SIGNIFICANT CHANGE UP (ref 8.4–10.5)
PTH-INTACT FLD-MCNC: 48 PG/ML — SIGNIFICANT CHANGE UP (ref 15–65)
VIT D25+D1,25 OH+D1,25 PNL SERPL-MCNC: 80.8 PG/ML — HIGH (ref 19.9–79.3)

## 2017-06-28 DIAGNOSIS — G56.00 CARPAL TUNNEL SYNDROME, UNSPECIFIED UPPER LIMB: ICD-10-CM

## 2017-06-28 DIAGNOSIS — E83.50 UNSPECIFIED DISORDER OF CALCIUM METABOLISM: ICD-10-CM

## 2017-07-02 ENCOUNTER — FORM ENCOUNTER (OUTPATIENT)
Age: 32
End: 2017-07-02

## 2017-07-02 LAB — PTH RELATED PROT SERPL-MCNC: <1.1 PMOL/L — SIGNIFICANT CHANGE UP

## 2017-07-03 ENCOUNTER — OUTPATIENT (OUTPATIENT)
Dept: OUTPATIENT SERVICES | Facility: HOSPITAL | Age: 32
LOS: 1 days | End: 2017-07-03
Payer: MEDICARE

## 2017-07-03 ENCOUNTER — APPOINTMENT (OUTPATIENT)
Dept: ULTRASOUND IMAGING | Facility: CLINIC | Age: 32
End: 2017-07-03

## 2017-07-03 DIAGNOSIS — Z98.890 OTHER SPECIFIED POSTPROCEDURAL STATES: Chronic | ICD-10-CM

## 2017-07-03 DIAGNOSIS — N91.1 SECONDARY AMENORRHEA: ICD-10-CM

## 2017-07-03 DIAGNOSIS — T82.599A OTHER MECHANICAL COMPLICATION OF UNSPECIFIED CARDIAC AND VASCULAR DEVICES AND IMPLANTS, INITIAL ENCOUNTER: Chronic | ICD-10-CM

## 2017-07-03 DIAGNOSIS — Z00.8 ENCOUNTER FOR OTHER GENERAL EXAMINATION: ICD-10-CM

## 2017-07-03 DIAGNOSIS — Z98.2 PRESENCE OF CEREBROSPINAL FLUID DRAINAGE DEVICE: Chronic | ICD-10-CM

## 2017-07-03 PROCEDURE — 76830 TRANSVAGINAL US NON-OB: CPT

## 2017-07-05 ENCOUNTER — APPOINTMENT (OUTPATIENT)
Dept: SPEECH THERAPY | Facility: CLINIC | Age: 32
End: 2017-07-05

## 2017-07-11 ENCOUNTER — APPOINTMENT (OUTPATIENT)
Dept: INTERNAL MEDICINE | Facility: CLINIC | Age: 32
End: 2017-07-11

## 2017-07-19 ENCOUNTER — MEDICATION RENEWAL (OUTPATIENT)
Age: 32
End: 2017-07-19

## 2017-07-20 ENCOUNTER — APPOINTMENT (OUTPATIENT)
Dept: ORTHOPEDIC SURGERY | Facility: CLINIC | Age: 32
End: 2017-07-20

## 2017-07-20 VITALS
HEART RATE: 111 BPM | WEIGHT: 289 LBS | DIASTOLIC BLOOD PRESSURE: 77 MMHG | BODY MASS INDEX: 51.21 KG/M2 | SYSTOLIC BLOOD PRESSURE: 107 MMHG | HEIGHT: 63 IN

## 2017-08-04 ENCOUNTER — OUTPATIENT (OUTPATIENT)
Dept: OUTPATIENT SERVICES | Facility: HOSPITAL | Age: 32
LOS: 1 days | End: 2017-08-04
Payer: MEDICARE

## 2017-08-04 DIAGNOSIS — T82.599A OTHER MECHANICAL COMPLICATION OF UNSPECIFIED CARDIAC AND VASCULAR DEVICES AND IMPLANTS, INITIAL ENCOUNTER: Chronic | ICD-10-CM

## 2017-08-04 DIAGNOSIS — Z98.2 PRESENCE OF CEREBROSPINAL FLUID DRAINAGE DEVICE: Chronic | ICD-10-CM

## 2017-08-04 DIAGNOSIS — Z98.890 OTHER SPECIFIED POSTPROCEDURAL STATES: Chronic | ICD-10-CM

## 2017-08-04 DIAGNOSIS — K08.9 DISORDER OF TEETH AND SUPPORTING STRUCTURES, UNSPECIFIED: ICD-10-CM

## 2017-08-04 PROCEDURE — D0330: CPT

## 2017-08-04 PROCEDURE — D0150: CPT

## 2017-08-07 ENCOUNTER — OTHER (OUTPATIENT)
Age: 32
End: 2017-08-07

## 2017-08-08 DIAGNOSIS — Z01.20 ENCOUNTER FOR DENTAL EXAMINATION AND CLEANING WITHOUT ABNORMAL FINDINGS: ICD-10-CM

## 2017-08-11 ENCOUNTER — APPOINTMENT (OUTPATIENT)
Dept: PAIN MANAGEMENT | Facility: CLINIC | Age: 32
End: 2017-08-11
Payer: MEDICARE

## 2017-08-11 VITALS
DIASTOLIC BLOOD PRESSURE: 82 MMHG | BODY MASS INDEX: 51.03 KG/M2 | HEIGHT: 63 IN | HEART RATE: 114 BPM | SYSTOLIC BLOOD PRESSURE: 112 MMHG | WEIGHT: 288 LBS

## 2017-08-11 DIAGNOSIS — F31.9 BIPOLAR DISORDER, UNSPECIFIED: ICD-10-CM

## 2017-08-11 DIAGNOSIS — R42 DIZZINESS AND GIDDINESS: ICD-10-CM

## 2017-08-11 DIAGNOSIS — F09 UNSPECIFIED MENTAL DISORDER DUE TO KNOWN PHYSIOLOGICAL CONDITION: ICD-10-CM

## 2017-08-11 PROCEDURE — 99214 OFFICE O/P EST MOD 30 MIN: CPT

## 2017-08-12 ENCOUNTER — EMERGENCY (EMERGENCY)
Facility: HOSPITAL | Age: 32
LOS: 1 days | Discharge: HOME | End: 2017-08-12
Attending: EMERGENCY MEDICINE | Admitting: EMERGENCY MEDICINE
Payer: MEDICARE

## 2017-08-12 VITALS
DIASTOLIC BLOOD PRESSURE: 79 MMHG | WEIGHT: 250 LBS | RESPIRATION RATE: 17 BRPM | SYSTOLIC BLOOD PRESSURE: 108 MMHG | TEMPERATURE: 99 F | HEART RATE: 107 BPM | OXYGEN SATURATION: 98 %

## 2017-08-12 VITALS
DIASTOLIC BLOOD PRESSURE: 84 MMHG | RESPIRATION RATE: 16 BRPM | SYSTOLIC BLOOD PRESSURE: 131 MMHG | TEMPERATURE: 99 F | HEART RATE: 112 BPM | OXYGEN SATURATION: 98 %

## 2017-08-12 DIAGNOSIS — T82.599A OTHER MECHANICAL COMPLICATION OF UNSPECIFIED CARDIAC AND VASCULAR DEVICES AND IMPLANTS, INITIAL ENCOUNTER: Chronic | ICD-10-CM

## 2017-08-12 DIAGNOSIS — Z98.890 OTHER SPECIFIED POSTPROCEDURAL STATES: Chronic | ICD-10-CM

## 2017-08-12 DIAGNOSIS — Z98.2 PRESENCE OF CEREBROSPINAL FLUID DRAINAGE DEVICE: Chronic | ICD-10-CM

## 2017-08-12 PROCEDURE — 99282 EMERGENCY DEPT VISIT SF MDM: CPT

## 2017-08-12 PROCEDURE — 99283 EMERGENCY DEPT VISIT LOW MDM: CPT | Mod: GC

## 2017-08-12 NOTE — ED PROVIDER NOTE - OBJECTIVE STATEMENT
31F with pmhx of bipolar illness, gout, diabetes, hydrocephalus secondary to TBI status post  shunt, lymphadenopathy, meningitis, migraines, obesity, and PCOS 31F with pmhx of bipolar illness, gout, diabetes, hydrocephalus secondary to TBI status post  shunt, lymphadenopathy, meningitis, migraines, obesity, and PCOS presents with 2d h/o intermittent poking sensation in L forehead which she thinks is  shunt pain.  Saw her neurosurgeon (Katty Monroy) yesterday for checkup, told poking sensation is not concerning and could be headache.  Denies fever, chills, weakness, sensory loss, eye pain, ear pain, neck pain, dizziness, lightheadedness, n/v, blurry vision.

## 2017-08-12 NOTE — ED PROVIDER NOTE - ATTENDING CONTRIBUTION TO CARE
30 yo female with two indwelling  shunts and multiple medical comorbidities including chronic HA, c/o L frontal stabbing pain, concerned that "catheter is poking my brain".  Very well appearing on exam without neuro deficit.  Almost certainly not shunt failure.  Neurosurgery to talk to patient; no further imaging or work-up at this time.  Recc's given yesterday by neurology (Son) to treat her "ice pick" headache.

## 2017-08-12 NOTE — ED PROVIDER NOTE - PHYSICAL EXAMINATION
***GEN - well appearing; NAD   ***HEAD - NC/AT  ***EYES/NOSE - PEERL, EOMI, mucous membranes moist, no discharge   ***THROAT: Oral cavity and pharynx normal. No inflammation, swelling, exudate, or lesions.    ***NECK: supple, non-tender no lymphadenopathy  ***PULMONARY - CTA b/l, symmetric breath sounds.   ***CARDIAC- s1s2, RRR, no murmur  ***ABDOMEN - +BS, ND, NT, soft, no guarding, no rebound, no organomegaly  ***BACK - no CVA tenderness, Normal  spine, no spinal TTP  ***EXTREMITIES - symmetric pulses, 2+ dp, capillary refill < 2 seconds, no clubbing, no cyanosis, no edema   ***SKIN - warm, dry, intact, no rash or bruising   ***NEUROLOGIC - a&o x3, CN 3-12 intact, sensation nl, motor 5/5 RUE/LUE/RLE/LLE gait nl,

## 2017-08-12 NOTE — ED ADULT NURSE NOTE - OBJECTIVE STATEMENT
30 y/o female pmh shunt placement x 2 presents to ED c/o h/a since this AM. Pt states sudden onset of headache "in the middle of my forehead". Pt states having similar headache 2 days ago, saw neurologist and sent home. Pt neuro exam intact, PERRL, EOMI, strength equal and approp. BL. Pt denies CP, SOB, n/v, fever/chills,no decreased eating/drinking. Pain is 7/10, manageable for pt. Pt reports baseline dizziness since shunt placement, but no worse than usual.

## 2017-08-12 NOTE — ED ADULT NURSE REASSESSMENT NOTE - NS ED NURSE REASSESS COMMENT FT1
Pt d/c to home, no IV placed, pt verbalized manageable level of pain. Pt denies CP, SOB, n/v, fever/chills at time of d/c, neuro exam intact, pt to follow up w/ neurosurg, verbalized understanding of d/c instructions. Safety and comfort maintained while in ED.

## 2017-08-12 NOTE — ED PROVIDER NOTE - NS ED ROS FT
Constitutional: No fever or chills  Eyes: No visual changes  HEENT: No throat pain  CV: No chest pain  Resp: No SOB no cough  GI: No abd pain, nausea or vomiting  : No dysuria  MSK: No musculoskeletal pain  Skin: No rash  Neuro: No headache

## 2017-08-16 ENCOUNTER — APPOINTMENT (OUTPATIENT)
Dept: INTERNAL MEDICINE | Facility: CLINIC | Age: 32
End: 2017-08-16
Payer: MEDICARE

## 2017-08-16 ENCOUNTER — OUTPATIENT (OUTPATIENT)
Dept: OUTPATIENT SERVICES | Facility: HOSPITAL | Age: 32
LOS: 1 days | End: 2017-08-16
Payer: MEDICARE

## 2017-08-16 VITALS — HEART RATE: 110 BPM

## 2017-08-16 VITALS
DIASTOLIC BLOOD PRESSURE: 80 MMHG | HEIGHT: 63 IN | WEIGHT: 285 LBS | SYSTOLIC BLOOD PRESSURE: 120 MMHG | BODY MASS INDEX: 50.5 KG/M2

## 2017-08-16 DIAGNOSIS — E83.52 HYPERCALCEMIA: ICD-10-CM

## 2017-08-16 DIAGNOSIS — E28.2 POLYCYSTIC OVARIAN SYNDROME: ICD-10-CM

## 2017-08-16 DIAGNOSIS — I10 ESSENTIAL (PRIMARY) HYPERTENSION: ICD-10-CM

## 2017-08-16 DIAGNOSIS — E66.01 MORBID (SEVERE) OBESITY DUE TO EXCESS CALORIES: ICD-10-CM

## 2017-08-16 DIAGNOSIS — Z98.890 OTHER SPECIFIED POSTPROCEDURAL STATES: Chronic | ICD-10-CM

## 2017-08-16 DIAGNOSIS — E55.9 VITAMIN D DEFICIENCY, UNSPECIFIED: ICD-10-CM

## 2017-08-16 DIAGNOSIS — D86.9 SARCOIDOSIS, UNSPECIFIED: ICD-10-CM

## 2017-08-16 DIAGNOSIS — Z98.2 PRESENCE OF CEREBROSPINAL FLUID DRAINAGE DEVICE: Chronic | ICD-10-CM

## 2017-08-16 DIAGNOSIS — T82.599A OTHER MECHANICAL COMPLICATION OF UNSPECIFIED CARDIAC AND VASCULAR DEVICES AND IMPLANTS, INITIAL ENCOUNTER: Chronic | ICD-10-CM

## 2017-08-16 PROCEDURE — 99213 OFFICE O/P EST LOW 20 MIN: CPT | Mod: GE

## 2017-08-16 PROCEDURE — G0463: CPT

## 2017-08-16 RX ORDER — MEDROXYPROGESTERONE ACETATE 10 MG/1
10 TABLET ORAL
Qty: 90 | Refills: 0 | Status: DISCONTINUED | COMMUNITY
Start: 2017-06-20 | End: 2017-08-16

## 2017-08-21 ENCOUNTER — RX RENEWAL (OUTPATIENT)
Age: 32
End: 2017-08-21

## 2017-09-08 ENCOUNTER — OUTPATIENT (OUTPATIENT)
Dept: OUTPATIENT SERVICES | Facility: HOSPITAL | Age: 32
LOS: 1 days | End: 2017-09-08
Payer: MEDICARE

## 2017-09-08 DIAGNOSIS — T82.599A OTHER MECHANICAL COMPLICATION OF UNSPECIFIED CARDIAC AND VASCULAR DEVICES AND IMPLANTS, INITIAL ENCOUNTER: Chronic | ICD-10-CM

## 2017-09-08 DIAGNOSIS — K08.9 DISORDER OF TEETH AND SUPPORTING STRUCTURES, UNSPECIFIED: ICD-10-CM

## 2017-09-08 DIAGNOSIS — Z98.2 PRESENCE OF CEREBROSPINAL FLUID DRAINAGE DEVICE: Chronic | ICD-10-CM

## 2017-09-08 DIAGNOSIS — Z98.890 OTHER SPECIFIED POSTPROCEDURAL STATES: Chronic | ICD-10-CM

## 2017-09-08 PROCEDURE — D4341: CPT

## 2017-09-11 ENCOUNTER — OUTPATIENT (OUTPATIENT)
Dept: OUTPATIENT SERVICES | Facility: HOSPITAL | Age: 32
LOS: 1 days | End: 2017-09-11
Payer: MEDICARE

## 2017-09-11 DIAGNOSIS — K08.9 DISORDER OF TEETH AND SUPPORTING STRUCTURES, UNSPECIFIED: ICD-10-CM

## 2017-09-11 DIAGNOSIS — K05.30 CHRONIC PERIODONTITIS, UNSPECIFIED: ICD-10-CM

## 2017-09-11 DIAGNOSIS — T82.599A OTHER MECHANICAL COMPLICATION OF UNSPECIFIED CARDIAC AND VASCULAR DEVICES AND IMPLANTS, INITIAL ENCOUNTER: Chronic | ICD-10-CM

## 2017-09-11 DIAGNOSIS — Z98.890 OTHER SPECIFIED POSTPROCEDURAL STATES: Chronic | ICD-10-CM

## 2017-09-11 DIAGNOSIS — Z98.2 PRESENCE OF CEREBROSPINAL FLUID DRAINAGE DEVICE: Chronic | ICD-10-CM

## 2017-09-11 PROCEDURE — D4341: CPT

## 2017-09-12 ENCOUNTER — RX RENEWAL (OUTPATIENT)
Age: 32
End: 2017-09-12

## 2017-10-10 ENCOUNTER — RX RENEWAL (OUTPATIENT)
Age: 32
End: 2017-10-10

## 2017-10-23 ENCOUNTER — LABORATORY RESULT (OUTPATIENT)
Age: 32
End: 2017-10-23

## 2017-10-23 ENCOUNTER — APPOINTMENT (OUTPATIENT)
Dept: INTERNAL MEDICINE | Facility: CLINIC | Age: 32
End: 2017-10-23
Payer: MEDICARE

## 2017-10-23 ENCOUNTER — OUTPATIENT (OUTPATIENT)
Dept: OUTPATIENT SERVICES | Facility: HOSPITAL | Age: 32
LOS: 1 days | End: 2017-10-23
Payer: MEDICARE

## 2017-10-23 VITALS
SYSTOLIC BLOOD PRESSURE: 130 MMHG | HEIGHT: 63 IN | DIASTOLIC BLOOD PRESSURE: 82 MMHG | WEIGHT: 287 LBS | BODY MASS INDEX: 50.85 KG/M2

## 2017-10-23 VITALS — HEART RATE: 90 BPM

## 2017-10-23 DIAGNOSIS — I10 ESSENTIAL (PRIMARY) HYPERTENSION: ICD-10-CM

## 2017-10-23 DIAGNOSIS — R73.03 PREDIABETES: ICD-10-CM

## 2017-10-23 DIAGNOSIS — R21 RASH AND OTHER NONSPECIFIC SKIN ERUPTION: ICD-10-CM

## 2017-10-23 DIAGNOSIS — R79.89 OTHER SPECIFIED ABNORMAL FINDINGS OF BLOOD CHEMISTRY: ICD-10-CM

## 2017-10-23 DIAGNOSIS — Z98.2 PRESENCE OF CEREBROSPINAL FLUID DRAINAGE DEVICE: Chronic | ICD-10-CM

## 2017-10-23 DIAGNOSIS — R73.03 PREDIABETES.: ICD-10-CM

## 2017-10-23 DIAGNOSIS — Z98.890 OTHER SPECIFIED POSTPROCEDURAL STATES: Chronic | ICD-10-CM

## 2017-10-23 DIAGNOSIS — T82.599A OTHER MECHANICAL COMPLICATION OF UNSPECIFIED CARDIAC AND VASCULAR DEVICES AND IMPLANTS, INITIAL ENCOUNTER: Chronic | ICD-10-CM

## 2017-10-23 LAB
ALBUMIN SERPL ELPH-MCNC: 4.4 G/DL — SIGNIFICANT CHANGE UP (ref 3.3–5)
ALP SERPL-CCNC: 140 U/L — HIGH (ref 40–120)
ALT FLD-CCNC: 122 U/L — HIGH (ref 10–45)
ANION GAP SERPL CALC-SCNC: 15 MMOL/L — SIGNIFICANT CHANGE UP (ref 5–17)
AST SERPL-CCNC: 73 U/L — HIGH (ref 10–40)
BILIRUB SERPL-MCNC: 0.3 MG/DL — SIGNIFICANT CHANGE UP (ref 0.2–1.2)
BUN SERPL-MCNC: 12 MG/DL — SIGNIFICANT CHANGE UP (ref 7–23)
CALCIUM SERPL-MCNC: 10.8 MG/DL — HIGH (ref 8.4–10.5)
CHLORIDE SERPL-SCNC: 98 MMOL/L — SIGNIFICANT CHANGE UP (ref 96–108)
CO2 SERPL-SCNC: 29 MMOL/L — SIGNIFICANT CHANGE UP (ref 22–31)
CREAT SERPL-MCNC: 0.73 MG/DL — SIGNIFICANT CHANGE UP (ref 0.5–1.3)
GLUCOSE SERPL-MCNC: 102 MG/DL — HIGH (ref 70–99)
HBA1C BLD-MCNC: 6.5 % — HIGH (ref 4–5.6)
POTASSIUM SERPL-MCNC: 4.6 MMOL/L — SIGNIFICANT CHANGE UP (ref 3.5–5.3)
POTASSIUM SERPL-SCNC: 4.6 MMOL/L — SIGNIFICANT CHANGE UP (ref 3.5–5.3)
PROT SERPL-MCNC: 8.7 G/DL — HIGH (ref 6–8.3)
SODIUM SERPL-SCNC: 142 MMOL/L — SIGNIFICANT CHANGE UP (ref 135–145)

## 2017-10-23 PROCEDURE — 80053 COMPREHEN METABOLIC PANEL: CPT

## 2017-10-23 PROCEDURE — 83036 HEMOGLOBIN GLYCOSYLATED A1C: CPT

## 2017-10-23 PROCEDURE — G0463: CPT

## 2017-10-23 PROCEDURE — 99213 OFFICE O/P EST LOW 20 MIN: CPT | Mod: GE

## 2017-10-23 RX ORDER — DICLOFENAC SODIUM 75 MG/1
75 TABLET, DELAYED RELEASE ORAL
Qty: 60 | Refills: 3 | Status: ACTIVE | COMMUNITY
Start: 2017-02-16 | End: 1900-01-01

## 2017-11-07 ENCOUNTER — OUTPATIENT (OUTPATIENT)
Dept: OUTPATIENT SERVICES | Facility: HOSPITAL | Age: 32
LOS: 1 days | End: 2017-11-07
Payer: MEDICARE

## 2017-11-07 DIAGNOSIS — Z98.890 OTHER SPECIFIED POSTPROCEDURAL STATES: Chronic | ICD-10-CM

## 2017-11-07 DIAGNOSIS — Z98.2 PRESENCE OF CEREBROSPINAL FLUID DRAINAGE DEVICE: Chronic | ICD-10-CM

## 2017-11-07 DIAGNOSIS — T82.599A OTHER MECHANICAL COMPLICATION OF UNSPECIFIED CARDIAC AND VASCULAR DEVICES AND IMPLANTS, INITIAL ENCOUNTER: Chronic | ICD-10-CM

## 2017-11-07 DIAGNOSIS — K08.9 DISORDER OF TEETH AND SUPPORTING STRUCTURES, UNSPECIFIED: ICD-10-CM

## 2017-11-07 DIAGNOSIS — K05.30 CHRONIC PERIODONTITIS, UNSPECIFIED: ICD-10-CM

## 2017-11-07 PROCEDURE — D4341: CPT

## 2017-11-20 ENCOUNTER — APPOINTMENT (OUTPATIENT)
Dept: PAIN MANAGEMENT | Facility: CLINIC | Age: 32
End: 2017-11-20
Payer: MEDICARE

## 2017-11-20 VITALS
SYSTOLIC BLOOD PRESSURE: 134 MMHG | HEIGHT: 63 IN | BODY MASS INDEX: 50.14 KG/M2 | HEART RATE: 107 BPM | WEIGHT: 283 LBS | DIASTOLIC BLOOD PRESSURE: 91 MMHG

## 2017-11-20 DIAGNOSIS — G43.109 MIGRAINE WITH AURA, NOT INTRACTABLE, W/OUT STATUS MIGRAINOSUS: ICD-10-CM

## 2017-11-20 DIAGNOSIS — R51 HEADACHE: ICD-10-CM

## 2017-11-20 DIAGNOSIS — H90.3 SENSORINEURAL HEARING LOSS, BILATERAL: ICD-10-CM

## 2017-11-20 DIAGNOSIS — G91.9 HYDROCEPHALUS, UNSPECIFIED: ICD-10-CM

## 2017-11-20 PROCEDURE — 99214 OFFICE O/P EST MOD 30 MIN: CPT

## 2017-12-07 ENCOUNTER — RX RENEWAL (OUTPATIENT)
Age: 32
End: 2017-12-07

## 2017-12-27 ENCOUNTER — EMERGENCY (EMERGENCY)
Facility: HOSPITAL | Age: 32
LOS: 1 days | Discharge: HOME | End: 2017-12-27
Attending: EMERGENCY MEDICINE | Admitting: EMERGENCY MEDICINE
Payer: MEDICARE

## 2017-12-27 VITALS
HEART RATE: 108 BPM | TEMPERATURE: 98 F | WEIGHT: 283.96 LBS | OXYGEN SATURATION: 99 % | SYSTOLIC BLOOD PRESSURE: 125 MMHG | DIASTOLIC BLOOD PRESSURE: 88 MMHG | RESPIRATION RATE: 18 BRPM

## 2017-12-27 VITALS
OXYGEN SATURATION: 96 % | TEMPERATURE: 98 F | RESPIRATION RATE: 17 BRPM | HEART RATE: 111 BPM | SYSTOLIC BLOOD PRESSURE: 135 MMHG | DIASTOLIC BLOOD PRESSURE: 91 MMHG

## 2017-12-27 DIAGNOSIS — Z98.2 PRESENCE OF CEREBROSPINAL FLUID DRAINAGE DEVICE: Chronic | ICD-10-CM

## 2017-12-27 DIAGNOSIS — Z98.890 OTHER SPECIFIED POSTPROCEDURAL STATES: Chronic | ICD-10-CM

## 2017-12-27 DIAGNOSIS — T82.599A OTHER MECHANICAL COMPLICATION OF UNSPECIFIED CARDIAC AND VASCULAR DEVICES AND IMPLANTS, INITIAL ENCOUNTER: Chronic | ICD-10-CM

## 2017-12-27 PROCEDURE — 70250 X-RAY EXAM OF SKULL: CPT | Mod: 26

## 2017-12-27 PROCEDURE — 74000: CPT | Mod: 26

## 2017-12-27 PROCEDURE — 99284 EMERGENCY DEPT VISIT MOD MDM: CPT | Mod: 25

## 2017-12-27 PROCEDURE — 70250 X-RAY EXAM OF SKULL: CPT

## 2017-12-27 PROCEDURE — 74000: CPT

## 2017-12-27 PROCEDURE — 71010: CPT

## 2017-12-27 PROCEDURE — 70450 CT HEAD/BRAIN W/O DYE: CPT | Mod: 26

## 2017-12-27 PROCEDURE — 70450 CT HEAD/BRAIN W/O DYE: CPT

## 2017-12-27 PROCEDURE — 99284 EMERGENCY DEPT VISIT MOD MDM: CPT | Mod: GC

## 2017-12-27 PROCEDURE — 71010: CPT | Mod: 26

## 2017-12-27 NOTE — CONSULT NOTE ADULT - SUBJECTIVE AND OBJECTIVE BOX
p (1480)     HPI: 33 yo obese Female with hx of bipolar illness, gout, diabetes, hydrocephalus secondary to TBI status post  shunt, migraines, cluster headaches, and PCOS presenting with three day history of headaches. Patient reports band-like sensation sound her head. Associated with lightheadedness, dizziness. No fevers, chills, nausea, vomiting, blurry vision. No change in gait. She takes diclofenac 2-3 times per days for a very long time- has not been requiring extra doses. Was last evaluated by neurosurgery last year. CTH showed mildly increased left temporal horn and 4th vent.    PAST MEDICAL HISTORY   Diabetes  Obesity  Lymphadenopathy  Tachycardia  Bipolar illness  Pulmonary nodules/lesions, multiple  PCOS (polycystic ovarian syndrome)  Gout  Meningitis  Hydrocephalus  Migraines  Migraine    PAST SURGICAL HISTORY   S/P  shunt  H/O brain surgery  Brain ventricular shunt obstruction  No significant past surgical history    gadolinium containing compounds (Hives)  Keppra (Unknown)      MEDICATIONS:  Antibiotics:    Neuro:    Anticoagulation:    Other:      SOCIAL HISTORY:   Occupation:   Marital Status:     FAMILY HISTORY:  No pertinent family history in first degree relatives      REVIEW OF SYSTEMS:  Check here if all are normal other than Neurological [x]  General:  Eyes:  ENT:  Cardiac:  Respiratory:  GI:  Musculoskeletal:   Skin:  Neurologic:   Psychiatric:     PHYSICAL EXAMINATION:   T(C): 36.9 (12-27-17 @ 12:34), Max: 36.9 (12-27-17 @ 11:57)  HR: 111 (12-27-17 @ 12:34) (108 - 111)  BP: 135/91 (12-27-17 @ 12:34) (125/88 - 135/91)  RR: 17 (12-27-17 @ 12:34) (17 - 18)  SpO2: 96% (12-27-17 @ 12:34) (96% - 99%)  Wt(kg): --  Weight (kg): 128.8 (12-27 @ 11:57)    General Examination:     Neurologic Examination:           AOx3, FC, PERRL, EOMI, V1-3 intact, no facial, palate ilir symmetric, tongue midline, shrug 5/5  5/5 throughout, no drift  SILT  No clonus or babinski      LABS:                RADIOLOGY & ADDITIONAL STUDIES: p (1480)     HPI: 33 yo obese Female with hx of bipolar illness, gout, diabetes, hydrocephalus secondary to TBI status post  shunt, migraines, cluster headaches, and PCOS presenting with three day history of HA and dizziness, constant and not worsening. Patient reports band-like sensation sound her head. Associated with lightheadedness, dizziness. No fevers, chills, nausea, vomiting, blurry vision. Positive history of cluster HA and migraines.  She states that her dizziness has improved since coming into the ED. No change in gait. CTH showed minimally increased left temporal horn and 4th vent.     PAST MEDICAL HISTORY   Diabetes  Obesity  Lymphadenopathy  Tachycardia  Bipolar illness  Pulmonary nodules/lesions, multiple  PCOS (polycystic ovarian syndrome)  Gout  Meningitis  Hydrocephalus  Migraines  Migraine    PAST SURGICAL HISTORY   S/P  shunt  H/O brain surgery  Brain ventricular shunt obstruction  No significant past surgical history    gadolinium containing compounds (Hives)  Keppra (Unknown)      MEDICATIONS:  Antibiotics:    Neuro:    Anticoagulation:    Other:      SOCIAL HISTORY:   Occupation:   Marital Status:     FAMILY HISTORY:  No pertinent family history in first degree relatives      REVIEW OF SYSTEMS:  Check here if all are normal other than Neurological [x]  General:  Eyes:  ENT:  Cardiac:  Respiratory:  GI:  Musculoskeletal:   Skin:  Neurologic:   Psychiatric:     PHYSICAL EXAMINATION:   T(C): 36.9 (12-27-17 @ 12:34), Max: 36.9 (12-27-17 @ 11:57)  HR: 111 (12-27-17 @ 12:34) (108 - 111)  BP: 135/91 (12-27-17 @ 12:34) (125/88 - 135/91)  RR: 17 (12-27-17 @ 12:34) (17 - 18)  SpO2: 96% (12-27-17 @ 12:34) (96% - 99%)  Wt(kg): --  Weight (kg): 128.8 (12-27 @ 11:57)    General Examination:     Neurologic Examination:           AOx3, FC, PERRL, EOMI, V1-3 intact, , left gaze nystagmus, no facial, palate ilir symmetric, tongue midline, shrug 5/5  R HF 2/5 pain limited (stable), 5/5 throughout, no drift  SILT  No clonus or babinski  Shunt pumps and refills    LABS:                RADIOLOGY & ADDITIONAL STUDIES:

## 2017-12-27 NOTE — ED ADULT NURSE NOTE - OBJECTIVE STATEMENT
32y f pt c/o headache x 3 days and doesn't feel her "shunts draining" for 3 days; pt states headache same as usually gets; describes pain in front moving from left to right side; pt states + photosensitivity but no other vision changes; pt mother states pt has hx of dementia and doesn't  remember a lot of things; aox2; no resp distress; no unusual sob; no n/v; + diarrhea which pt and mother state is normal; denies vision changes; no fever/chills; no visible neurological deficits; mother states pt at baseline; skin warm dry intact; pt uses walker for ambulation assist; pt has hx of 2  shunts; mother states they gage/cross into abd; pt states can usually "pumps in shunts draining"; adv pt need urine; md at bedside 32y f pt c/o headache x 3 days and doesn't feel her "shunts draining" for 3 days; pt has hx of hydrocephalus and has had shunt adjustments in the past; pt states headache same as usually gets; describes pain in front moving from left to right side; pt states + photosensitivity but no other vision changes; pt mother states pt has hx of dementia and doesn't  remember a lot of things; aox2; no resp distress; no unusual sob; no n/v; + diarrhea which pt and mother state is normal; denies vision changes; no fever/chills; no visible neurological deficits; mother states pt at baseline; skin warm dry intact; pt uses walker for ambulation assist; pt has hx of 2  shunts; mother states they gage/cross into abd; pt states can usually "pumps in shunts draining"; adv pt need urine; md at bedside

## 2017-12-27 NOTE — ED PROVIDER NOTE - MEDICAL DECISION MAKING DETAILS
Will obtain XRay shunt followed by neurosurgery consult. Will obtain CT and XRay shunt followed by neurosurgery consult. Will obtain CT and XRay shunt followed by neurosurgery consult.  Attending Elías: 31 y/o female h/o TBI in the past s/p  shunt placement presenting with concern for shunt malfunction. pt does have worsening headache. no focal neurologic deficit. no recent trauma. will obtain CT head, shunt series and d/w neurosurgery. no fevers or evidence of infection

## 2017-12-27 NOTE — ED PROVIDER NOTE - PLAN OF CARE
improved You were seen by the Neurosurgery team. Please follow up with your neurologist in 1-2 weeks. If you have worsening headaches, fevers, blurry vision, neurological changes, please return to the emergency room.

## 2017-12-27 NOTE — ED PROVIDER NOTE - CARE PLAN
Principal Discharge DX:	Headache  Goal:	improved  Instructions for follow-up, activity and diet:	You were seen by the Neurosurgery team. Please follow up with your neurologist in 1-2 weeks. If you have worsening headaches, fevers, blurry vision, neurological changes, please return to the emergency room.

## 2017-12-27 NOTE — ED PROVIDER NOTE - SKIN, MLM
Skin normal color for race, warm, dry and intact. No evidence of rash. Skin normal color for race, warm, dry and intact. hirsutism

## 2017-12-27 NOTE — CONSULT NOTE ADULT - ASSESSMENT
31 yo obese Female with hx of migraines and hydrocephalus secondary to TBI status post  shunt, presenting with three day history of headaches.  CTH shows small increase in left temporal horn and 4th vent.    - 33 yo obese Female with hx of migraines and hydrocephalus secondary to TBI status post  shunt, presenting with three day history of headaches and dizziness, similar to her previous HA (migraines and cluster). CTH shows small increase in left temporal horn and 4th vent. Given absence of n/v and improvement of dizziness, unlikely to be shunt failure.    -no acute neurosurgical intervention  -may fu outpatient with Dr. Monroy

## 2017-12-27 NOTE — ED PROVIDER NOTE - OBJECTIVE STATEMENT
31 yo obese Female with hx of bipolar illness, gout, diabetes, hydrocephalus secondary to TBI status post  shunt, migraines, cluster headaches, and PCOS presenting with three day history of headaches. Patient reports band-like sensation sound her head. Associated with lightheadedness, dizziness, and sensation that the shunt is not working. She usually hears a pump indicating that the pump is working but she has not been having that sensation. Reports photophobia. No eye lacrimation, rhinorrhea. No fevers, chills, nausea, vomiting, blurry vision. No change in gait. She takes diclofenac 2-3 times per days for a very long time- has not been requiring extra doses. Was last evaluated by neurosurgery last year.

## 2017-12-27 NOTE — ED PROVIDER NOTE - PROGRESS NOTE DETAILS
Neurosurgery team was consulted, will come see the patient after imaging it completed Attending Elías: pt seen by neurosurgery, stable for d/c shunt functioning

## 2017-12-27 NOTE — ED PROVIDER NOTE - PHYSICAL EXAMINATION
Attending Mckinley: Gen: NAD, heent: mmm, neck; nttp, left horizontal nystagmus, pupils reactive, op pink, neck: nttp, cv: rrr, lungs; Ctab, abd: soft, nontender, nondistended, obese, ext: LE edema, wwp, neuro: awake, stable gait, speech clear, sensation intact, skin: no rash.

## 2018-01-23 ENCOUNTER — APPOINTMENT (OUTPATIENT)
Dept: ORTHOPEDIC SURGERY | Facility: CLINIC | Age: 33
End: 2018-01-23
Payer: MEDICARE

## 2018-01-23 PROCEDURE — 20526 THER INJECTION CARP TUNNEL: CPT | Mod: RT

## 2018-01-23 PROCEDURE — 99213 OFFICE O/P EST LOW 20 MIN: CPT | Mod: 25

## 2018-02-15 ENCOUNTER — APPOINTMENT (OUTPATIENT)
Dept: ORTHOPEDIC SURGERY | Facility: CLINIC | Age: 33
End: 2018-02-15
Payer: MEDICARE

## 2018-02-15 PROCEDURE — 99214 OFFICE O/P EST MOD 30 MIN: CPT

## 2018-02-27 RX ORDER — HYDROCODONE BITARTRATE AND ACETAMINOPHEN 5; 325 MG/1; MG/1
5-325 TABLET ORAL
Qty: 5 | Refills: 0 | Status: ACTIVE | COMMUNITY
Start: 2018-02-27 | End: 1900-01-01

## 2018-02-28 ENCOUNTER — OUTPATIENT (OUTPATIENT)
Dept: OUTPATIENT SERVICES | Facility: HOSPITAL | Age: 33
LOS: 1 days | End: 2018-02-28
Payer: MEDICARE

## 2018-02-28 ENCOUNTER — APPOINTMENT (OUTPATIENT)
Dept: INTERNAL MEDICINE | Facility: CLINIC | Age: 33
End: 2018-02-28
Payer: MEDICARE

## 2018-02-28 ENCOUNTER — OUTPATIENT (OUTPATIENT)
Dept: OUTPATIENT SERVICES | Facility: HOSPITAL | Age: 33
LOS: 1 days | End: 2018-02-28

## 2018-02-28 VITALS
HEIGHT: 63 IN | WEIGHT: 293 LBS | SYSTOLIC BLOOD PRESSURE: 122 MMHG | DIASTOLIC BLOOD PRESSURE: 80 MMHG | BODY MASS INDEX: 51.91 KG/M2

## 2018-02-28 VITALS
RESPIRATION RATE: 22 BRPM | DIASTOLIC BLOOD PRESSURE: 77 MMHG | SYSTOLIC BLOOD PRESSURE: 113 MMHG | HEART RATE: 104 BPM | TEMPERATURE: 98 F | WEIGHT: 293 LBS | HEIGHT: 63 IN | OXYGEN SATURATION: 96 %

## 2018-02-28 VITALS — TEMPERATURE: 98.5 F | HEART RATE: 106 BPM

## 2018-02-28 DIAGNOSIS — Z98.2 PRESENCE OF CEREBROSPINAL FLUID DRAINAGE DEVICE: Chronic | ICD-10-CM

## 2018-02-28 DIAGNOSIS — R00.0 TACHYCARDIA, UNSPECIFIED: ICD-10-CM

## 2018-02-28 DIAGNOSIS — Z01.818 ENCOUNTER FOR OTHER PREPROCEDURAL EXAMINATION: ICD-10-CM

## 2018-02-28 DIAGNOSIS — I10 ESSENTIAL (PRIMARY) HYPERTENSION: ICD-10-CM

## 2018-02-28 DIAGNOSIS — G56.01 CARPAL TUNNEL SYNDROME, RIGHT UPPER LIMB: ICD-10-CM

## 2018-02-28 DIAGNOSIS — G91.9 HYDROCEPHALUS, UNSPECIFIED: ICD-10-CM

## 2018-02-28 DIAGNOSIS — T82.599A OTHER MECHANICAL COMPLICATION OF UNSPECIFIED CARDIAC AND VASCULAR DEVICES AND IMPLANTS, INITIAL ENCOUNTER: Chronic | ICD-10-CM

## 2018-02-28 DIAGNOSIS — Z98.890 OTHER SPECIFIED POSTPROCEDURAL STATES: Chronic | ICD-10-CM

## 2018-02-28 LAB — HBA1C BLD-MCNC: 6.5 % — HIGH (ref 4–5.6)

## 2018-02-28 PROCEDURE — 80048 BASIC METABOLIC PNL TOTAL CA: CPT

## 2018-02-28 PROCEDURE — 85027 COMPLETE CBC AUTOMATED: CPT

## 2018-02-28 PROCEDURE — 83036 HEMOGLOBIN GLYCOSYLATED A1C: CPT

## 2018-02-28 PROCEDURE — 99213 OFFICE O/P EST LOW 20 MIN: CPT | Mod: GE

## 2018-02-28 PROCEDURE — G0463: CPT

## 2018-02-28 RX ORDER — ARIPIPRAZOLE 5 MG/1
5 TABLET ORAL
Refills: 0 | Status: ACTIVE | COMMUNITY
Start: 2018-02-28

## 2018-02-28 RX ORDER — LIDOCAINE HCL 20 MG/ML
0.2 VIAL (ML) INJECTION ONCE
Qty: 0 | Refills: 0 | Status: DISCONTINUED | OUTPATIENT
Start: 2018-03-05 | End: 2018-03-20

## 2018-02-28 RX ORDER — METFORMIN HYDROCHLORIDE 850 MG/1
0 TABLET ORAL
Qty: 0 | Refills: 0 | COMMUNITY

## 2018-02-28 RX ORDER — DICLOFENAC SODIUM 75 MG/1
0 TABLET, DELAYED RELEASE ORAL
Qty: 0 | Refills: 0 | COMMUNITY

## 2018-02-28 RX ORDER — ARIPIPRAZOLE 15 MG/1
0 TABLET ORAL
Qty: 0 | Refills: 0 | COMMUNITY

## 2018-02-28 RX ORDER — SODIUM CHLORIDE 9 MG/ML
3 INJECTION INTRAMUSCULAR; INTRAVENOUS; SUBCUTANEOUS EVERY 8 HOURS
Qty: 0 | Refills: 0 | Status: DISCONTINUED | OUTPATIENT
Start: 2018-03-05 | End: 2018-03-20

## 2018-02-28 NOTE — H&P PST ADULT - HISTORY OF PRESENT ILLNESS
32yr old female with carpal tunnel syndrome coming in for  right carpal tunnel release. Pt with multiple medical issues.   shunt stable. Pt getting medical Eval

## 2018-02-28 NOTE — H&P PST ADULT - PMH
Bipolar illness    Chronic headaches    Diabetes    Gout    Hydrocephalus  shunt  Lymphadenopathy  mediastinal  Meningitis    Migraine    Migraines    Obesity  morbid  PCOS (polycystic ovarian syndrome)    Sleep apnea  no tx  Tachycardia  sinus

## 2018-03-02 ENCOUNTER — APPOINTMENT (OUTPATIENT)
Dept: CARDIOTHORACIC SURGERY | Facility: CLINIC | Age: 33
End: 2018-03-02
Payer: MEDICARE

## 2018-03-02 VITALS
TEMPERATURE: 98 F | HEIGHT: 63 IN | BODY MASS INDEX: 51.91 KG/M2 | OXYGEN SATURATION: 95 % | RESPIRATION RATE: 16 BRPM | WEIGHT: 293 LBS | DIASTOLIC BLOOD PRESSURE: 81 MMHG | HEART RATE: 115 BPM | SYSTOLIC BLOOD PRESSURE: 116 MMHG

## 2018-03-02 DIAGNOSIS — Z01.818 ENCOUNTER FOR OTHER PREPROCEDURAL EXAMINATION: ICD-10-CM

## 2018-03-02 DIAGNOSIS — Z87.891 PERSONAL HISTORY OF NICOTINE DEPENDENCE: ICD-10-CM

## 2018-03-02 DIAGNOSIS — E66.01 MORBID (SEVERE) OBESITY DUE TO EXCESS CALORIES: ICD-10-CM

## 2018-03-02 PROCEDURE — 99214 OFFICE O/P EST MOD 30 MIN: CPT

## 2018-03-02 RX ORDER — MEDROXYPROGESTERONE ACETATE 10 MG/1
10 TABLET ORAL
Qty: 30 | Refills: 1 | Status: COMPLETED | COMMUNITY
Start: 2017-06-22 | End: 2018-03-02

## 2018-03-05 ENCOUNTER — OUTPATIENT (OUTPATIENT)
Dept: OUTPATIENT SERVICES | Facility: HOSPITAL | Age: 33
LOS: 1 days | End: 2018-03-05
Payer: MEDICARE

## 2018-03-05 ENCOUNTER — APPOINTMENT (OUTPATIENT)
Dept: ORTHOPEDIC SURGERY | Facility: HOSPITAL | Age: 33
End: 2018-03-05

## 2018-03-05 VITALS
HEART RATE: 104 BPM | WEIGHT: 293 LBS | TEMPERATURE: 98 F | HEIGHT: 63 IN | DIASTOLIC BLOOD PRESSURE: 83 MMHG | SYSTOLIC BLOOD PRESSURE: 120 MMHG | OXYGEN SATURATION: 98 % | RESPIRATION RATE: 18 BRPM

## 2018-03-05 VITALS
HEART RATE: 95 BPM | RESPIRATION RATE: 16 BRPM | DIASTOLIC BLOOD PRESSURE: 68 MMHG | SYSTOLIC BLOOD PRESSURE: 119 MMHG | OXYGEN SATURATION: 97 %

## 2018-03-05 DIAGNOSIS — Z98.2 PRESENCE OF CEREBROSPINAL FLUID DRAINAGE DEVICE: Chronic | ICD-10-CM

## 2018-03-05 DIAGNOSIS — G56.01 CARPAL TUNNEL SYNDROME, RIGHT UPPER LIMB: ICD-10-CM

## 2018-03-05 DIAGNOSIS — T82.599A OTHER MECHANICAL COMPLICATION OF UNSPECIFIED CARDIAC AND VASCULAR DEVICES AND IMPLANTS, INITIAL ENCOUNTER: Chronic | ICD-10-CM

## 2018-03-05 DIAGNOSIS — Z98.890 OTHER SPECIFIED POSTPROCEDURAL STATES: Chronic | ICD-10-CM

## 2018-03-05 LAB — GLUCOSE BLDC GLUCOMTR-MCNC: 91 MG/DL — SIGNIFICANT CHANGE UP (ref 70–99)

## 2018-03-05 PROCEDURE — 64721 CARPAL TUNNEL SURGERY: CPT | Mod: RT

## 2018-03-05 PROCEDURE — 82962 GLUCOSE BLOOD TEST: CPT

## 2018-03-05 RX ORDER — ACETAMINOPHEN 500 MG
1000 TABLET ORAL ONCE
Qty: 0 | Refills: 0 | Status: COMPLETED | OUTPATIENT
Start: 2018-03-05 | End: 2018-03-05

## 2018-03-05 RX ORDER — METFORMIN HYDROCHLORIDE 850 MG/1
250 TABLET ORAL
Qty: 0 | Refills: 0 | COMMUNITY

## 2018-03-05 RX ORDER — ALLOPURINOL 300 MG
1 TABLET ORAL
Qty: 0 | Refills: 0 | COMMUNITY

## 2018-03-05 RX ORDER — DICLOFENAC SODIUM 75 MG/1
0 TABLET, DELAYED RELEASE ORAL
Qty: 0 | Refills: 0 | COMMUNITY

## 2018-03-05 RX ORDER — CELECOXIB 200 MG/1
200 CAPSULE ORAL ONCE
Qty: 0 | Refills: 0 | Status: DISCONTINUED | OUTPATIENT
Start: 2018-03-05 | End: 2018-03-20

## 2018-03-05 RX ORDER — CELECOXIB 200 MG/1
200 CAPSULE ORAL ONCE
Qty: 0 | Refills: 0 | Status: COMPLETED | OUTPATIENT
Start: 2018-03-05 | End: 2018-03-05

## 2018-03-05 RX ORDER — OXYCODONE HYDROCHLORIDE 5 MG/1
5 TABLET ORAL ONCE
Qty: 0 | Refills: 0 | Status: DISCONTINUED | OUTPATIENT
Start: 2018-03-05 | End: 2018-03-05

## 2018-03-05 RX ORDER — SODIUM CHLORIDE 9 MG/ML
1000 INJECTION, SOLUTION INTRAVENOUS
Qty: 0 | Refills: 0 | Status: DISCONTINUED | OUTPATIENT
Start: 2018-03-05 | End: 2018-03-20

## 2018-03-05 RX ORDER — ONDANSETRON 8 MG/1
4 TABLET, FILM COATED ORAL ONCE
Qty: 0 | Refills: 0 | Status: DISCONTINUED | OUTPATIENT
Start: 2018-03-05 | End: 2018-03-20

## 2018-03-05 NOTE — ASU DISCHARGE PLAN (ADULT/PEDIATRIC). - MEDICATION SUMMARY - MEDICATIONS TO CHANGE
I will SWITCH the dose or number of times a day I take the medications listed below when I get home from the hospital:  None Warm

## 2018-03-05 NOTE — ASU DISCHARGE PLAN (ADULT/PEDIATRIC). - MEDICATION SUMMARY - MEDICATIONS TO TAKE
I will START or STAY ON the medications listed below when I get home from the hospital:    Norco 5 mg-325 mg oral tablet  -- 1 tab(s) by mouth every 6 hours  -- Indication: For pain    diclofenac sodium 75 mg oral delayed release tablet  -- headache  -- Indication: For home med    metFORMIN  -- 250 milligram(s) by mouth once a day  -- Indication: For home med    allopurinol 300 mg oral tablet  -- 1 tab(s) by mouth once a day  -- Indication: For home med    Abilify  --  by mouth 7.5 mg evening  -- Indication: For home med    multivitamin  --   1 tab po daily  -- Indication: For home emd

## 2018-03-05 NOTE — ASU PREOP CHECKLIST - COMMENTS
6 ounces green apple gatorade @ 2470 this am then NPO 6 ounces green apple gatorade @ 5087 this am then NPO pt alert and oriented to time place and person, mother present for admission

## 2018-03-05 NOTE — ASU DISCHARGE PLAN (ADULT/PEDIATRIC). - NOTIFY
Bleeding that does not stop/Pain not relieved by Medications/Numbness, color, or temperature change to extremity/Fever greater than 101/Swelling that continues

## 2018-03-07 ENCOUNTER — TRANSCRIPTION ENCOUNTER (OUTPATIENT)
Age: 33
End: 2018-03-07

## 2018-03-15 ENCOUNTER — APPOINTMENT (OUTPATIENT)
Dept: ORTHOPEDIC SURGERY | Facility: CLINIC | Age: 33
End: 2018-03-15
Payer: MEDICARE

## 2018-03-15 DIAGNOSIS — G56.01 CARPAL TUNNEL SYNDROME, RIGHT UPPER LIMB: ICD-10-CM

## 2018-03-15 PROCEDURE — 99024 POSTOP FOLLOW-UP VISIT: CPT

## 2018-09-10 ENCOUNTER — RX RENEWAL (OUTPATIENT)
Age: 33
End: 2018-09-10

## 2018-12-03 ENCOUNTER — RX RENEWAL (OUTPATIENT)
Age: 33
End: 2018-12-03

## 2019-03-21 RX ORDER — METFORMIN ER 500 MG 500 MG/1
500 TABLET ORAL
Qty: 30 | Refills: 0 | Status: ACTIVE | COMMUNITY
Start: 2017-05-15 | End: 1900-01-01

## 2020-07-20 NOTE — H&P PST ADULT - PROBLEM SELECTOR PROBLEM 2
Hydrocephalus
[FreeTextEntry1] : HEENT: +left lateral tongue bite\par \par General: no apparent distress\par Mental Status: awake and alert, speech fluent and prosodic with no paraphasic errors\par Cranial Nerves: tracks in all directions, face symmetric, no dysarthria\par

## 2022-01-01 NOTE — H&P PST ADULT - MARITAL STATUS
Single Site: Sternum (13 Mar 2022 21:15)  Bilirubin: 7.2 (13 Mar 2022 21:15)  Bilirubin: 4.8 (13 Mar 2022 11:30)  Site: Sternum (13 Mar 2022 11:30)

## 2022-03-03 NOTE — ED PROVIDER NOTE - CONSTITUTIONAL, MLM
FINAL PRIOR AUTHORIZATION STATUS:    1.  Name of Medication & Dose: DULOXETINE HCL DR 30MG CAP      2. Prior Auth Status: Denied.  Reason: SCANNED INTO MEDIA     3. Action Taken: Pharmacy Notified: N\A Patient Notified: N\A   normal... Well appearing, well nourished, awake, alert, oriented to person, place, time/situation and in no apparent distress.

## 2022-07-25 NOTE — ED ADULT NURSE NOTE - PAIN RATING/NUMBER SCALE (0-10): REST
6 Chonodrocutaneous Helical Advancement Flap Text: The defect edges were debeveled with a #15 scalpel blade.  Given the location of the defect and the proximity to free margins a chondrocutaneous helical advancement flap was deemed most appropriate.  Using a sterile surgical marker, the appropriate advancement flap was drawn incorporating the defect and placing the expected incisions within the relaxed skin tension lines where possible.    The area thus outlined was incised deep to adipose tissue with a #15 scalpel blade.  The skin margins were undermined to an appropriate distance in all directions utilizing iris scissors.

## 2023-03-20 NOTE — ED ADULT NURSE NOTE - HEART RATE (BEATS/MIN)
Post-Op Assessment Note    CV Status:  Stable  Pain Score: 0    Pain management: adequate     Mental Status:  Alert and awake   Hydration Status:  Euvolemic   PONV Controlled:  Controlled   Airway Patency:  Patent      Post Op Vitals Reviewed: Yes      Staff: CRNA         No notable events documented      BP   109/72   Temp      Pulse  83   Resp   18   SpO2   99%
107

## 2025-05-01 NOTE — ED PROVIDER NOTE - CROS ED CONS ALL NEG
What Type Of Note Output Would You Prefer (Optional)?: Standard Output Hpi Title: Evaluation of Skin Lesions Additional History: Dad has a history of basal cell carcinoma. negative...
